# Patient Record
Sex: FEMALE | Race: WHITE | NOT HISPANIC OR LATINO | Employment: UNEMPLOYED | URBAN - METROPOLITAN AREA
[De-identification: names, ages, dates, MRNs, and addresses within clinical notes are randomized per-mention and may not be internally consistent; named-entity substitution may affect disease eponyms.]

---

## 2017-03-15 ENCOUNTER — HOSPITAL ENCOUNTER (OUTPATIENT)
Dept: RADIOLOGY | Facility: MEDICAL CENTER | Age: 52
Discharge: HOME | End: 2017-03-15
Payer: COMMERCIAL

## 2017-03-15 DIAGNOSIS — Z12.31 OTHER SCREENING MAMMOGRAM: ICD-10-CM

## 2017-03-15 PROCEDURE — G0202 SCR MAMMO BI INCL CAD: HCPCS

## 2017-07-03 ENCOUNTER — TRANSCRIBE ORDERS (OUTPATIENT)
Dept: URGENT CARE | Facility: MEDICAL CENTER | Age: 52
End: 2017-07-03

## 2017-07-03 ENCOUNTER — APPOINTMENT (OUTPATIENT)
Dept: RADIOLOGY | Facility: MEDICAL CENTER | Age: 52
End: 2017-07-03
Payer: COMMERCIAL

## 2017-07-03 DIAGNOSIS — S93.402A SPRAIN OF LEFT ANKLE, UNSPECIFIED LIGAMENT, INITIAL ENCOUNTER: Primary | ICD-10-CM

## 2017-07-03 PROCEDURE — 73600 X-RAY EXAM OF ANKLE: CPT

## 2018-03-15 ENCOUNTER — TRANSCRIBE ORDERS (OUTPATIENT)
Dept: ADMINISTRATIVE | Facility: HOSPITAL | Age: 53
End: 2018-03-15

## 2018-03-15 DIAGNOSIS — Z12.31 VISIT FOR SCREENING MAMMOGRAM: Primary | ICD-10-CM

## 2018-04-12 ENCOUNTER — HOSPITAL ENCOUNTER (OUTPATIENT)
Dept: RADIOLOGY | Facility: MEDICAL CENTER | Age: 53
Discharge: HOME | End: 2018-04-12
Payer: COMMERCIAL

## 2018-04-12 DIAGNOSIS — Z12.31 VISIT FOR SCREENING MAMMOGRAM: ICD-10-CM

## 2018-04-12 PROCEDURE — 77067 SCR MAMMO BI INCL CAD: CPT

## 2019-04-16 ENCOUNTER — TRANSCRIBE ORDERS (OUTPATIENT)
Dept: ADMINISTRATIVE | Facility: HOSPITAL | Age: 54
End: 2019-04-16

## 2019-04-16 DIAGNOSIS — Z12.39 BREAST SCREENING, UNSPECIFIED: Primary | ICD-10-CM

## 2019-04-18 ENCOUNTER — HOSPITAL ENCOUNTER (OUTPATIENT)
Dept: RADIOLOGY | Facility: MEDICAL CENTER | Age: 54
Discharge: HOME | End: 2019-04-18
Payer: COMMERCIAL

## 2019-04-18 VITALS — HEIGHT: 68 IN | BODY MASS INDEX: 37.13 KG/M2 | WEIGHT: 245 LBS

## 2019-04-18 DIAGNOSIS — Z12.39 BREAST SCREENING, UNSPECIFIED: ICD-10-CM

## 2019-04-18 PROCEDURE — 77067 SCR MAMMO BI INCL CAD: CPT

## 2019-07-09 ENCOUNTER — TRANSCRIBE ORDERS (OUTPATIENT)
Dept: URGENT CARE | Facility: MEDICAL CENTER | Age: 54
End: 2019-07-09

## 2019-07-09 ENCOUNTER — APPOINTMENT (OUTPATIENT)
Dept: RADIOLOGY | Facility: MEDICAL CENTER | Age: 54
End: 2019-07-09
Payer: COMMERCIAL

## 2019-07-09 DIAGNOSIS — M79.672 LEFT FOOT PAIN: ICD-10-CM

## 2019-07-09 DIAGNOSIS — M79.672 LEFT FOOT PAIN: Primary | ICD-10-CM

## 2019-07-09 PROCEDURE — 73630 X-RAY EXAM OF FOOT: CPT

## 2020-07-13 ENCOUNTER — TRANSCRIBE ORDERS (OUTPATIENT)
Dept: ADMINISTRATIVE | Facility: HOSPITAL | Age: 55
End: 2020-07-13

## 2020-07-13 DIAGNOSIS — Z12.31 ENCOUNTER FOR SCREENING MAMMOGRAM FOR MALIGNANT NEOPLASM OF BREAST: Primary | ICD-10-CM

## 2020-09-24 ENCOUNTER — HOSPITAL ENCOUNTER (OUTPATIENT)
Dept: RADIOLOGY | Facility: MEDICAL CENTER | Age: 55
Discharge: HOME | End: 2020-09-24
Payer: COMMERCIAL

## 2020-09-24 VITALS — HEIGHT: 68 IN | BODY MASS INDEX: 38.34 KG/M2 | WEIGHT: 253 LBS

## 2020-09-24 DIAGNOSIS — Z12.31 VISIT FOR SCREENING MAMMOGRAM: ICD-10-CM

## 2020-09-24 DIAGNOSIS — Z12.31 ENCOUNTER FOR SCREENING MAMMOGRAM FOR MALIGNANT NEOPLASM OF BREAST: ICD-10-CM

## 2020-09-24 PROCEDURE — 77067 SCR MAMMO BI INCL CAD: CPT

## 2020-09-24 PROCEDURE — 77063 BREAST TOMOSYNTHESIS BI: CPT

## 2020-11-04 ENCOUNTER — NURSE TRIAGE (OUTPATIENT)
Dept: OTHER | Facility: OTHER | Age: 55
End: 2020-11-04

## 2020-11-04 DIAGNOSIS — Z20.822 COVID-19 RULED OUT: ICD-10-CM

## 2020-11-04 DIAGNOSIS — Z20.822 COVID-19 RULED OUT: Primary | ICD-10-CM

## 2020-11-04 PROCEDURE — U0003 INFECTIOUS AGENT DETECTION BY NUCLEIC ACID (DNA OR RNA); SEVERE ACUTE RESPIRATORY SYNDROME CORONAVIRUS 2 (SARS-COV-2) (CORONAVIRUS DISEASE [COVID-19]), AMPLIFIED PROBE TECHNIQUE, MAKING USE OF HIGH THROUGHPUT TECHNOLOGIES AS DESCRIBED BY CMS-2020-01-R: HCPCS | Performed by: FAMILY MEDICINE

## 2020-11-06 LAB — SARS-COV-2 RNA SPEC QL NAA+PROBE: NOT DETECTED

## 2020-11-16 ENCOUNTER — TELEPHONE (OUTPATIENT)
Dept: OBGYN CLINIC | Facility: CLINIC | Age: 55
End: 2020-11-16

## 2021-04-08 DIAGNOSIS — Z23 ENCOUNTER FOR IMMUNIZATION: ICD-10-CM

## 2021-07-19 ENCOUNTER — ANNUAL EXAM (OUTPATIENT)
Dept: OBGYN CLINIC | Facility: CLINIC | Age: 56
End: 2021-07-19
Payer: COMMERCIAL

## 2021-07-19 VITALS
WEIGHT: 250 LBS | DIASTOLIC BLOOD PRESSURE: 82 MMHG | HEIGHT: 68 IN | BODY MASS INDEX: 37.89 KG/M2 | SYSTOLIC BLOOD PRESSURE: 128 MMHG

## 2021-07-19 DIAGNOSIS — Z12.31 SCREENING MAMMOGRAM, ENCOUNTER FOR: ICD-10-CM

## 2021-07-19 DIAGNOSIS — Z01.419 ENCOUNTER FOR GYNECOLOGICAL EXAMINATION WITHOUT ABNORMAL FINDING: Primary | ICD-10-CM

## 2021-07-19 DIAGNOSIS — Z12.4 SCREENING FOR MALIGNANT NEOPLASM OF CERVIX: ICD-10-CM

## 2021-07-19 PROCEDURE — 99386 PREV VISIT NEW AGE 40-64: CPT | Performed by: OBSTETRICS & GYNECOLOGY

## 2021-07-19 PROCEDURE — G0145 SCR C/V CYTO,THINLAYER,RESCR: HCPCS | Performed by: OBSTETRICS & GYNECOLOGY

## 2021-07-19 RX ORDER — VALSARTAN 80 MG/1
80 TABLET ORAL EVERY EVENING
COMMUNITY
Start: 2021-07-07

## 2021-07-19 RX ORDER — METOPROLOL TARTRATE 100 MG/1
100 TABLET ORAL 2 TIMES DAILY
COMMUNITY
Start: 2021-07-02

## 2021-07-19 RX ORDER — CETIRIZINE HYDROCHLORIDE 10 MG/1
TABLET ORAL
COMMUNITY

## 2021-07-19 RX ORDER — SIMVASTATIN 40 MG
40 TABLET ORAL DAILY
COMMUNITY
Start: 2021-07-06

## 2021-07-19 RX ORDER — FERROUS SULFATE 325(65) MG
TABLET ORAL
COMMUNITY

## 2021-07-19 RX ORDER — LEVOTHYROXINE SODIUM 0.12 MG/1
TABLET ORAL
COMMUNITY
Start: 2021-02-10

## 2021-07-19 NOTE — PROGRESS NOTES
Assessment/Plan:         Diagnoses and all orders for this visit:    Encounter for gynecological examination without abnormal finding    Screening mammogram, encounter for  -     Mammo screening bilateral w 3d & cad; Future    Other orders  -     metoprolol tartrate (LOPRESSOR) 100 mg tablet; Take 100 mg by mouth 2 (two) times a day  -     levothyroxine 125 mcg tablet  -     valsartan (DIOVAN) 80 mg tablet; Take 80 mg by mouth every evening  -     simvastatin (ZOCOR) 40 mg tablet; Take 40 mg by mouth daily  -     ferrous sulfate 325 (65 Fe) mg tablet  -     cetirizine (ZyrTEC) 10 mg tablet          Subjective:      Patient ID: Анна Harris is a 63 y o  female  The patient is a 48-year-old  2 para  who presents with amenorrhea since 2020  She denies hot flashes, night sweats or any other symptoms attributable to menopause  She is generally in good health  And has had no medical changes over the past year  She is vaccinated for the COVID infection  She states 7 takes care of her granddaughter  She is following proper precautions during the pandemic  She will schedule a mammogram in the near future  And we will see her back in 1 year or as needed  The following portions of the patient's history were reviewed and updated as appropriate: allergies, current medications, past family history, past medical history, past social history, past surgical history and problem list     Review of Systems   Constitutional: Negative for chills, diaphoresis, fatigue, fever and unexpected weight change  HENT: Negative for congestion, sinus pressure, sinus pain, tinnitus and trouble swallowing  Eyes: Negative for visual disturbance  Respiratory: Negative for cough, chest tightness and shortness of breath  Cardiovascular: Negative for chest pain, palpitations and leg swelling     Gastrointestinal: Negative for abdominal distention, abdominal pain, anal bleeding, constipation, diarrhea, nausea, rectal pain and vomiting  Endocrine: Negative for heat intolerance  Genitourinary: Negative for difficulty urinating, dysuria, flank pain, frequency, genital sores, hematuria and urgency  Musculoskeletal: Negative for arthralgias, back pain and joint swelling  Skin: Negative for rash  Allergic/Immunologic: Negative for environmental allergies and food allergies  Neurological: Negative for headaches  Hematological: Negative for adenopathy  Does not bruise/bleed easily  Psychiatric/Behavioral: Negative for decreased concentration and dysphoric mood  The patient is not nervous/anxious  Objective:      /82 (BP Location: Left arm, Patient Position: Sitting, Cuff Size: Standard)   Ht 5' 8" (1 727 m)   Wt 113 kg (250 lb)   LMP 11/01/2020 (Approximate)   BMI 38 01 kg/m²          Physical Exam  Vitals and nursing note reviewed  Exam conducted with a chaperone present  Constitutional:       General: She is not in acute distress  Appearance: Normal appearance  She is normal weight  She is not ill-appearing  HENT:      Head: Normocephalic  Nose: Nose normal       Mouth/Throat:      Mouth: Mucous membranes are moist       Pharynx: Oropharynx is clear  Eyes:      Conjunctiva/sclera: Conjunctivae normal       Pupils: Pupils are equal, round, and reactive to light  Cardiovascular:      Rate and Rhythm: Normal rate and regular rhythm  Pulses: Normal pulses  Pulmonary:      Effort: Pulmonary effort is normal       Breath sounds: Normal breath sounds  Chest:      Breasts: Sudeep Score is 5  Right: Normal  No mass, nipple discharge, skin change or tenderness  Left: Normal  No mass, nipple discharge, skin change or tenderness  Abdominal:      General: Abdomen is flat  Bowel sounds are normal       Palpations: Abdomen is soft  Genitourinary:     General: Normal vulva  Exam position: Lithotomy position  Sudeep stage (genital): 5  Vagina: Normal       Cervix: Normal       Uterus: Normal        Adnexa: Right adnexa normal and left adnexa normal       Rectum: Normal    Musculoskeletal:         General: Normal range of motion  Cervical back: Neck supple  Lymphadenopathy:      Upper Body:      Right upper body: No axillary adenopathy  Left upper body: No axillary adenopathy  Skin:     General: Skin is warm and dry  Neurological:      General: No focal deficit present  Mental Status: She is alert     Psychiatric:         Mood and Affect: Mood normal

## 2021-07-27 LAB
LAB AP GYN PRIMARY INTERPRETATION: NORMAL
Lab: NORMAL

## 2022-06-20 ENCOUNTER — HOSPITAL ENCOUNTER (EMERGENCY)
Facility: HOSPITAL | Age: 57
Discharge: HOME | End: 2022-06-21
Attending: EMERGENCY MEDICINE
Payer: COMMERCIAL

## 2022-06-20 VITALS
BODY MASS INDEX: 38.01 KG/M2 | SYSTOLIC BLOOD PRESSURE: 139 MMHG | WEIGHT: 250 LBS | OXYGEN SATURATION: 95 % | HEART RATE: 88 BPM | TEMPERATURE: 98.3 F | DIASTOLIC BLOOD PRESSURE: 85 MMHG | RESPIRATION RATE: 17 BRPM

## 2022-06-20 DIAGNOSIS — R42 VERTIGO: ICD-10-CM

## 2022-06-20 DIAGNOSIS — R42 DIZZINESS: Primary | ICD-10-CM

## 2022-06-20 LAB
ALBUMIN SERPL BCP-MCNC: 4.4 G/DL (ref 3.5–5)
ALP SERPL-CCNC: 74 U/L (ref 34–104)
ALT SERPL W P-5'-P-CCNC: 19 U/L (ref 7–52)
ANION GAP SERPL CALCULATED.3IONS-SCNC: 8 MMOL/L (ref 4–13)
AST SERPL W P-5'-P-CCNC: 18 U/L (ref 13–39)
BASOPHILS # BLD AUTO: 0.04 THOUSANDS/ΜL (ref 0–0.1)
BASOPHILS NFR BLD AUTO: 1 % (ref 0–1)
BILIRUB SERPL-MCNC: 0.41 MG/DL (ref 0.2–1)
BUN SERPL-MCNC: 16 MG/DL (ref 5–25)
CALCIUM SERPL-MCNC: 9.4 MG/DL (ref 8.4–10.2)
CARDIAC TROPONIN I PNL SERPL HS: 2 NG/L
CHLORIDE SERPL-SCNC: 105 MMOL/L (ref 96–108)
CO2 SERPL-SCNC: 26 MMOL/L (ref 21–32)
CREAT SERPL-MCNC: 0.91 MG/DL (ref 0.6–1.3)
EOSINOPHIL # BLD AUTO: 0.14 THOUSAND/ΜL (ref 0–0.61)
EOSINOPHIL NFR BLD AUTO: 2 % (ref 0–6)
ERYTHROCYTE [DISTWIDTH] IN BLOOD BY AUTOMATED COUNT: 13.3 % (ref 11.6–15.1)
GFR SERPL CREATININE-BSD FRML MDRD: 70 ML/MIN/1.73SQ M
GLUCOSE SERPL-MCNC: 106 MG/DL (ref 65–140)
HCT VFR BLD AUTO: 41.5 % (ref 34.8–46.1)
HGB BLD-MCNC: 13.4 G/DL (ref 11.5–15.4)
IMM GRANULOCYTES # BLD AUTO: 0.04 THOUSAND/UL (ref 0–0.2)
IMM GRANULOCYTES NFR BLD AUTO: 1 % (ref 0–2)
LYMPHOCYTES # BLD AUTO: 2.28 THOUSANDS/ΜL (ref 0.6–4.47)
LYMPHOCYTES NFR BLD AUTO: 31 % (ref 14–44)
MCH RBC QN AUTO: 28.6 PG (ref 26.8–34.3)
MCHC RBC AUTO-ENTMCNC: 32.3 G/DL (ref 31.4–37.4)
MCV RBC AUTO: 89 FL (ref 82–98)
MONOCYTES # BLD AUTO: 0.55 THOUSAND/ΜL (ref 0.17–1.22)
MONOCYTES NFR BLD AUTO: 7 % (ref 4–12)
NEUTROPHILS # BLD AUTO: 4.39 THOUSANDS/ΜL (ref 1.85–7.62)
NEUTS SEG NFR BLD AUTO: 58 % (ref 43–75)
NRBC BLD AUTO-RTO: 0 /100 WBCS
PLATELET # BLD AUTO: 292 THOUSANDS/UL (ref 149–390)
PMV BLD AUTO: 9.2 FL (ref 8.9–12.7)
POTASSIUM SERPL-SCNC: 3.9 MMOL/L (ref 3.5–5.3)
PROT SERPL-MCNC: 7.2 G/DL (ref 6.4–8.4)
RBC # BLD AUTO: 4.68 MILLION/UL (ref 3.81–5.12)
SODIUM SERPL-SCNC: 139 MMOL/L (ref 135–147)
WBC # BLD AUTO: 7.44 THOUSAND/UL (ref 4.31–10.16)

## 2022-06-20 PROCEDURE — 96361 HYDRATE IV INFUSION ADD-ON: CPT

## 2022-06-20 PROCEDURE — 80053 COMPREHEN METABOLIC PANEL: CPT | Performed by: EMERGENCY MEDICINE

## 2022-06-20 PROCEDURE — 99285 EMERGENCY DEPT VISIT HI MDM: CPT | Performed by: EMERGENCY MEDICINE

## 2022-06-20 PROCEDURE — 93005 ELECTROCARDIOGRAM TRACING: CPT

## 2022-06-20 PROCEDURE — 99284 EMERGENCY DEPT VISIT MOD MDM: CPT

## 2022-06-20 PROCEDURE — 36415 COLL VENOUS BLD VENIPUNCTURE: CPT

## 2022-06-20 PROCEDURE — 84484 ASSAY OF TROPONIN QUANT: CPT | Performed by: EMERGENCY MEDICINE

## 2022-06-20 PROCEDURE — 85025 COMPLETE CBC W/AUTO DIFF WBC: CPT | Performed by: EMERGENCY MEDICINE

## 2022-06-20 PROCEDURE — 96374 THER/PROPH/DIAG INJ IV PUSH: CPT

## 2022-06-20 RX ORDER — MECLIZINE HCL 12.5 MG/1
25 TABLET ORAL ONCE
Status: COMPLETED | OUTPATIENT
Start: 2022-06-20 | End: 2022-06-20

## 2022-06-20 RX ORDER — MECLIZINE HYDROCHLORIDE 25 MG/1
25 TABLET ORAL 3 TIMES DAILY PRN
Qty: 15 TABLET | Refills: 0 | Status: SHIPPED | OUTPATIENT
Start: 2022-06-20 | End: 2022-06-24

## 2022-06-20 RX ORDER — DIAZEPAM 5 MG/ML
5 INJECTION, SOLUTION INTRAMUSCULAR; INTRAVENOUS ONCE
Status: COMPLETED | OUTPATIENT
Start: 2022-06-20 | End: 2022-06-20

## 2022-06-20 RX ORDER — DIAZEPAM 5 MG/1
5 TABLET ORAL 2 TIMES DAILY
Qty: 20 TABLET | Refills: 0 | Status: SHIPPED | OUTPATIENT
Start: 2022-06-20 | End: 2022-06-30

## 2022-06-20 RX ADMIN — DIAZEPAM 5 MG: 10 INJECTION, SOLUTION INTRAMUSCULAR; INTRAVENOUS at 22:51

## 2022-06-20 RX ADMIN — MECLIZINE 25 MG: 12.5 TABLET ORAL at 22:51

## 2022-06-20 RX ADMIN — SODIUM CHLORIDE 1000 ML: 0.9 INJECTION, SOLUTION INTRAVENOUS at 22:51

## 2022-06-20 NOTE — Clinical Note
Samm Potts was seen and treated in our emergency department on 6/20/2022  Diagnosis:     Cindy Flores  may return to work on return date  She may return on this date: 06/23/2022         If you have any questions or concerns, please don't hesitate to call        Addie Morgan MD    ______________________________           _______________          _______________  [facility] Representative                              Date                                Time

## 2022-06-21 LAB
ATRIAL RATE: 94 BPM
P AXIS: 39 DEGREES
PR INTERVAL: 206 MS
QRS AXIS: 91 DEGREES
QRSD INTERVAL: 88 MS
QT INTERVAL: 328 MS
QTC INTERVAL: 402 MS
T WAVE AXIS: 21 DEGREES
VENTRICULAR RATE: 90 BPM

## 2022-06-21 PROCEDURE — 93010 ELECTROCARDIOGRAM REPORT: CPT | Performed by: INTERNAL MEDICINE

## 2022-06-21 NOTE — ED PROVIDER NOTES
History  Chief Complaint   Patient presents with    Dizziness     Pt with hx of vertigo c/o dizziness  Pt also c/o shaking on and off for several hours  History provided by:  Patient   used: No    Dizziness  Quality:  Head spinning and imbalance  Severity:  Moderate  Onset quality:  Gradual  Duration:  1 week  Timing:  Intermittent  Progression:  Waxing and waning  Chronicity:  New  Context: head movement and physical activity    Relieved by:  Being still  Worsened by:  Nothing  Ineffective treatments:  None tried  Associated symptoms: no chest pain, no diarrhea, no hearing loss, no nausea, no palpitations and no weakness    Risk factors: no new medications        Prior to Admission Medications   Prescriptions Last Dose Informant Patient Reported? Taking? cetirizine (ZyrTEC) 10 mg tablet 6/20/2022 at Unknown time  Yes Yes   ferrous sulfate 325 (65 Fe) mg tablet 6/20/2022 at Unknown time  Yes Yes   levothyroxine 125 mcg tablet 6/20/2022 at Unknown time  Yes Yes   metoprolol tartrate (LOPRESSOR) 100 mg tablet 6/20/2022 at Unknown time  Yes Yes   Sig: Take 100 mg by mouth 2 (two) times a day   simvastatin (ZOCOR) 40 mg tablet 6/19/2022 at Unknown time  Yes Yes   Sig: Take 40 mg by mouth daily   valsartan (DIOVAN) 80 mg tablet 6/19/2022 at Unknown time  Yes Yes   Sig: Take 80 mg by mouth every evening      Facility-Administered Medications: None       History reviewed  No pertinent past medical history  Past Surgical History:   Procedure Laterality Date    SHOULDER SURGERY      THYROIDECTOMY         Family History   Problem Relation Age of Onset    Colon cancer Father 80    Breast cancer Maternal Aunt 39    Brain cancer Mother 80    No Known Problems Sister     No Known Problems Son     No Known Problems Son      I have reviewed and agree with the history as documented      E-Cigarette/Vaping    E-Cigarette Use Never User      E-Cigarette/Vaping Substances     Social History Tobacco Use    Smoking status: Never Smoker    Smokeless tobacco: Never Used   Vaping Use    Vaping Use: Never used   Substance Use Topics    Alcohol use: Not Currently    Drug use: Not Currently       Review of Systems   HENT: Negative for hearing loss  Cardiovascular: Negative for chest pain and palpitations  Gastrointestinal: Negative for diarrhea and nausea  Neurological: Positive for dizziness  Negative for weakness  All other systems reviewed and are negative  Physical Exam  Physical Exam  Vitals and nursing note reviewed  Constitutional:       General: She is not in acute distress  Appearance: She is well-developed  HENT:      Head: Normocephalic and atraumatic  Eyes:      Pupils: Pupils are equal, round, and reactive to light  Cardiovascular:      Rate and Rhythm: Normal rate and regular rhythm  Heart sounds: Normal heart sounds  No murmur heard  Pulmonary:      Effort: Pulmonary effort is normal  No respiratory distress  Breath sounds: Normal breath sounds  No wheezing or rales  Abdominal:      General: Bowel sounds are normal  There is no distension  Palpations: Abdomen is soft  Tenderness: There is no abdominal tenderness  There is no guarding or rebound  Musculoskeletal:         General: No deformity  Normal range of motion  Cervical back: Normal range of motion and neck supple  Lymphadenopathy:      Cervical: No cervical adenopathy  Skin:     Capillary Refill: Capillary refill takes less than 2 seconds  Findings: No erythema or rash  Neurological:      Mental Status: She is alert and oriented to person, place, and time  Cranial Nerves: No cranial nerve deficit  Motor: No abnormal muscle tone        Coordination: Coordination normal    Psychiatric:         Behavior: Behavior normal          Vital Signs  ED Triage Vitals   Temperature Pulse Respirations Blood Pressure SpO2   06/20/22 2030 06/20/22 2027 06/20/22 2027 06/20/22 2027 06/20/22 2027   98 3 °F (36 8 °C) 93 18 147/83 96 %      Temp src Heart Rate Source Patient Position - Orthostatic VS BP Location FiO2 (%)   -- 06/20/22 2258 -- -- --    Monitor         Pain Score       --                  Vitals:    06/20/22 2027 06/20/22 2258   BP: 147/83 139/85   Pulse: 93 88         Visual Acuity      ED Medications  Medications   sodium chloride 0 9 % bolus 1,000 mL (0 mL Intravenous Stopped 6/21/22 0051)   meclizine (ANTIVERT) tablet 25 mg (25 mg Oral Given 6/20/22 2251)   diazepam (VALIUM) injection 5 mg (5 mg Intravenous Given 6/20/22 2251)       Diagnostic Studies  Results Reviewed     Procedure Component Value Units Date/Time    HS Troponin 0hr (reflex protocol) [[id number]]  (Normal) Collected: 06/20/22 2037    Lab Status: Final result Specimen: Blood from Arm, Right Updated: 06/20/22 2121     hs TnI 0hr 2 ng/L     Comprehensive metabolic panel [[id number]] Collected: 06/20/22 2037    Lab Status: Final result Specimen: Blood from Arm, Right Updated: 06/20/22 2112     Sodium 139 mmol/L      Potassium 3 9 mmol/L      Chloride 105 mmol/L      CO2 26 mmol/L      ANION GAP 8 mmol/L      BUN 16 mg/dL      Creatinine 0 91 mg/dL      Glucose 106 mg/dL      Calcium 9 4 mg/dL      AST 18 U/L      ALT 19 U/L      Alkaline Phosphatase 74 U/L      Total Protein 7 2 g/dL      Albumin 4 4 g/dL      Total Bilirubin 0 41 mg/dL      eGFR 70 ml/min/1 73sq m     Narrative:      Blayne guidelines for Chronic Kidney Disease (CKD):     Stage 1 with normal or high GFR (GFR > 90 mL/min/1 73 square meters)    Stage 2 Mild CKD (GFR = 60-89 mL/min/1 73 square meters)    Stage 3A Moderate CKD (GFR = 45-59 mL/min/1 73 square meters)    Stage 3B Moderate CKD (GFR = 30-44 mL/min/1 73 square meters)    Stage 4 Severe CKD (GFR = 15-29 mL/min/1 73 square meters)    Stage 5 End Stage CKD (GFR <15 mL/min/1 73 square meters)  Note: GFR calculation is accurate only with a steady state creatinine    CBC and differential [[id number]] Collected: 06/20/22 2037    Lab Status: Final result Specimen: Blood from Arm, Right Updated: 06/20/22 2052     WBC 7 44 Thousand/uL      RBC 4 68 Million/uL      Hemoglobin 13 4 g/dL      Hematocrit 41 5 %      MCV 89 fL      MCH 28 6 pg      MCHC 32 3 g/dL      RDW 13 3 %      MPV 9 2 fL      Platelets 245 Thousands/uL      nRBC 0 /100 WBCs      Neutrophils Relative 58 %      Immat GRANS % 1 %      Lymphocytes Relative 31 %      Monocytes Relative 7 %      Eosinophils Relative 2 %      Basophils Relative 1 %      Neutrophils Absolute 4 39 Thousands/µL      Immature Grans Absolute 0 04 Thousand/uL      Lymphocytes Absolute 2 28 Thousands/µL      Monocytes Absolute 0 55 Thousand/µL      Eosinophils Absolute 0 14 Thousand/µL      Basophils Absolute 0 04 Thousands/µL                  No orders to display              Procedures  ECG 12 Lead Documentation Only    Date/Time: 6/20/2022 10:42 PM  Performed by: June Smith MD  Authorized by: June Smith MD     Indications / Diagnosis:  Dizziness  ECG reviewed by me, the ED Provider: yes    Patient location:  ED  Interpretation:     Interpretation: normal    Rate:     ECG rate:  90    ECG rate assessment: normal    Rhythm:     Rhythm: sinus rhythm    Ectopy:     Ectopy: none    QRS:     QRS axis:  Normal    QRS intervals:  Normal  Conduction:     Conduction: normal    ST segments:     ST segments:  Normal  T waves:     T waves: non-specific               ED Course             HEART Risk Score    Flowsheet Row Most Recent Value   Heart Score Risk Calculator    History 0 Filed at: 06/20/2022 2243   ECG 1 Filed at: 06/20/2022 2243   Age 1 Filed at: 06/20/2022 2243   Risk Factors 1 Filed at: 06/20/2022 2243   Troponin 0 Filed at: 06/20/2022 2243   HEART Score 3 Filed at: 06/20/2022 2243                                      MDM  Number of Diagnoses or Management Options  Dizziness: new and requires workup  Vertigo: new and requires workup  Diagnosis management comments: Dizziness consistent with peripheral vertigo  No significant ataxia  No headache  No chest pain  History of the same  Has some sinus pain roughly 1 week ago  No tenderness over the sinuses at this point, do not suspect sinus infection  EKG, labs unremarkable  Patient did clinically improve after meclizine, Valium  Short prescription for both as an outpatient  Return precautions discussed, PCP follow-up recommended  Amount and/or Complexity of Data Reviewed  Clinical lab tests: ordered and reviewed  Tests in the medicine section of CPT®: ordered and reviewed    Risk of Complications, Morbidity, and/or Mortality  Presenting problems: high  Diagnostic procedures: moderate  Management options: high    Patient Progress  Patient progress: improved      Disposition  Final diagnoses:   Dizziness   Vertigo     Time reflects when diagnosis was documented in both MDM as applicable and the Disposition within this note     Time User Action Codes Description Comment    6/20/2022 11:37 PM Lola Feller Add [R42] Dizziness     6/20/2022 11:37 PM Lola Feller Add [R42] Vertigo       ED Disposition     ED Disposition   Discharge    Condition   Stable    Date/Time   Mon Jun 20, 2022 11:37 PM    [address] [facility] discharge to home/self care  Follow-up Information     Follow up With Specialties Details Why Contact Info Additional Ade Stewart DO Family Medicine Schedule an appointment as soon as possible for a visit in 3 days As needed [address]   [facility] [address] [address] Emergency Department Emergency Medicine Go to  If symptoms worsen [address] [facility] [address] [facility] Emergency Department, [address], [place], [place], [address]          Discharge Medication List as of 6/20/2022 11:39 PM      START taking these medications    Details   diazepam (VALIUM) 5 mg tablet Take 1 tablet (5 mg total) by mouth 2 (two) times a day for 10 days, Starting Mon 6/20/2022, Until Thu 6/30/2022, Normal      meclizine (ANTIVERT) 25 mg tablet Take 1 tablet (25 mg total) by mouth 3 (three) times a day as needed for dizziness for up to 5 days, Starting Mon 6/20/2022, Until Sat 6/25/2022 at 2359, Normal         CONTINUE these medications which have NOT CHANGED    Details   cetirizine (ZyrTEC) 10 mg tablet Historical Med      ferrous sulfate 325 (65 Fe) mg tablet Historical Med      levothyroxine 125 mcg tablet Starting Wed 2/10/2021, Historical Med      metoprolol tartrate (LOPRESSOR) 100 mg tablet Take 100 mg by mouth 2 (two) times a day, Starting Fri 7/2/2021, Historical Med      simvastatin (ZOCOR) 40 mg tablet Take 40 mg by mouth daily, Starting Tue 7/6/2021, Historical Med      valsartan (DIOVAN) 80 mg tablet Take 80 mg by mouth every evening, Starting Wed 7/7/2021, Historical Med             No discharge procedures on file      PDMP Review     None          ED Provider  Electronically Signed by           Jessica Cabezas MD  06/21/22 4231

## 2022-08-19 ENCOUNTER — TELEPHONE (OUTPATIENT)
Dept: GASTROENTEROLOGY | Facility: AMBULARY SURGERY CENTER | Age: 57
End: 2022-08-19

## 2022-08-19 ENCOUNTER — TELEPHONE (OUTPATIENT)
Dept: GASTROENTEROLOGY | Facility: CLINIC | Age: 57
End: 2022-08-19

## 2022-08-19 NOTE — TELEPHONE ENCOUNTER
Called and lmom for pt to call back to schedule her colon with Dr Rey Dickson  He recall is for 9/23/22  Needs to be scheduled after that date  I will try calling again in 1 week  If she calls back, please get her scheduled  Thank you

## 2022-08-19 NOTE — TELEPHONE ENCOUNTER
[facility] 26 Assessment    Name: Genny Gamboa  YOB: 1965  Last Height: 5' 8" (1 727 m)  Last weight: 113 kg (250 lb)  BMI: 38 01 kg/m²  Procedure: colon  Diagnosis: see order  Date of procedure: 10/10/22  Prep: miralax and dul  Responsible : yes  Phone#: [phone]  Name completing form: Darlene Giordano  Date form completed: 08/19/22      If the patient answers yes to any of these questions, schedule in a hospital  Are you pregnant: No  Do you rely on a wheelchair for mobility: No  Have you been diagnosed with End Stage Renal Disease (ESRD): No  Do you need oxygen during the day: No  Have you had a heart attack or stroke within the past three months: No  Have you had a seizure within the past three months: No  Have you ever been informed by anesthesia that you have a difficult airway: No  Additional Questions  Have you had any cardiac testing or are under the care of a Cardiologist (see cardiac list): No  Cardiac list:   Do you have an implanted cardiac defibrillator: No (Comment:  This patient should be scheduled in the hospital)    Have any bleeding problems, such as anemia or hemophilia (If patient has H&H result below 8, schedule in hospital   H&H must be within 30 days of procedure): No    Had an organ transplant within the past 3 months: No    Do you have any present infections: No  Do you get short of breath when walking a few blocks: No  Have you been diagnosed with diabetes: No  Comments (provide cardiac provider information if applicable):

## 2022-10-03 ENCOUNTER — TELEPHONE (OUTPATIENT)
Dept: GASTROENTEROLOGY | Facility: CLINIC | Age: 57
End: 2022-10-03

## 2022-10-03 ENCOUNTER — HOSPITAL ENCOUNTER (OUTPATIENT)
Dept: RADIOLOGY | Facility: MEDICAL CENTER | Age: 57
Discharge: HOME | End: 2022-10-03
Payer: COMMERCIAL

## 2022-10-03 VITALS — WEIGHT: 250 LBS | BODY MASS INDEX: 37.89 KG/M2 | HEIGHT: 68 IN

## 2022-10-03 DIAGNOSIS — Z12.31 ENCOUNTER FOR SCREENING MAMMOGRAM FOR MALIGNANT NEOPLASM OF BREAST: ICD-10-CM

## 2022-10-03 PROCEDURE — 77063 BREAST TOMOSYNTHESIS BI: CPT

## 2022-10-03 PROCEDURE — 77067 SCR MAMMO BI INCL CAD: CPT

## 2022-10-03 NOTE — TELEPHONE ENCOUNTER
lmom confirming pt's colonoscopy scheduled on 10/10/22 at [facility] with Dr Spring Media  Informed would be called 1-2 days prior with the arrival time  Informed of clear liquid diet the day prior as well as the bowel cleansing preparation  Informed would need a  the day of the procedure due to being under sedation  I asked pt to please call back if does not have instructions or if has any questions  Advised to contact insurance if has any questions regarding coverage of procedure

## 2022-10-10 ENCOUNTER — ANESTHESIA (OUTPATIENT)
Dept: GASTROENTEROLOGY | Facility: AMBULATORY SURGERY CENTER | Age: 57
End: 2022-10-10

## 2022-10-10 ENCOUNTER — HOSPITAL ENCOUNTER (OUTPATIENT)
Dept: GASTROENTEROLOGY | Facility: AMBULATORY SURGERY CENTER | Age: 57
Discharge: HOME | End: 2022-10-10
Payer: COMMERCIAL

## 2022-10-10 ENCOUNTER — ANESTHESIA EVENT (OUTPATIENT)
Dept: GASTROENTEROLOGY | Facility: AMBULATORY SURGERY CENTER | Age: 57
End: 2022-10-10

## 2022-10-10 VITALS
HEIGHT: 68 IN | RESPIRATION RATE: 18 BRPM | HEART RATE: 54 BPM | SYSTOLIC BLOOD PRESSURE: 114 MMHG | WEIGHT: 250 LBS | TEMPERATURE: 97 F | BODY MASS INDEX: 37.89 KG/M2 | OXYGEN SATURATION: 98 % | DIASTOLIC BLOOD PRESSURE: 65 MMHG

## 2022-10-10 DIAGNOSIS — Z80.0 FAMILY HISTORY OF COLON CANCER: ICD-10-CM

## 2022-10-10 DIAGNOSIS — Z86.010 HISTORY OF COLON POLYPS: ICD-10-CM

## 2022-10-10 PROCEDURE — G0121 COLON CA SCRN NOT HI RSK IND: HCPCS | Performed by: INTERNAL MEDICINE

## 2022-10-10 RX ORDER — LIDOCAINE HYDROCHLORIDE 10 MG/ML
INJECTION, SOLUTION EPIDURAL; INFILTRATION; INTRACAUDAL; PERINEURAL AS NEEDED
Status: DISCONTINUED | OUTPATIENT
Start: 2022-10-10 | End: 2022-10-10

## 2022-10-10 RX ORDER — PROPOFOL 10 MG/ML
INJECTION, EMULSION INTRAVENOUS AS NEEDED
Status: DISCONTINUED | OUTPATIENT
Start: 2022-10-10 | End: 2022-10-10

## 2022-10-10 RX ORDER — SODIUM CHLORIDE, SODIUM LACTATE, POTASSIUM CHLORIDE, CALCIUM CHLORIDE 600; 310; 30; 20 MG/100ML; MG/100ML; MG/100ML; MG/100ML
125 INJECTION, SOLUTION INTRAVENOUS CONTINUOUS
Status: CANCELLED | OUTPATIENT
Start: 2022-10-10

## 2022-10-10 RX ORDER — SODIUM CHLORIDE, SODIUM LACTATE, POTASSIUM CHLORIDE, CALCIUM CHLORIDE 600; 310; 30; 20 MG/100ML; MG/100ML; MG/100ML; MG/100ML
125 INJECTION, SOLUTION INTRAVENOUS CONTINUOUS
Status: DISCONTINUED | OUTPATIENT
Start: 2022-10-10 | End: 2022-10-14 | Stop reason: HOSPADM

## 2022-10-10 RX ORDER — SODIUM CHLORIDE, SODIUM LACTATE, POTASSIUM CHLORIDE, CALCIUM CHLORIDE 600; 310; 30; 20 MG/100ML; MG/100ML; MG/100ML; MG/100ML
INJECTION, SOLUTION INTRAVENOUS CONTINUOUS PRN
Status: DISCONTINUED | OUTPATIENT
Start: 2022-10-10 | End: 2022-10-10

## 2022-10-10 RX ADMIN — PROPOFOL 100 MG: 10 INJECTION, EMULSION INTRAVENOUS at 11:27

## 2022-10-10 RX ADMIN — SODIUM CHLORIDE, SODIUM LACTATE, POTASSIUM CHLORIDE, CALCIUM CHLORIDE: 600; 310; 30; 20 INJECTION, SOLUTION INTRAVENOUS at 11:23

## 2022-10-10 RX ADMIN — PROPOFOL 20 MG: 10 INJECTION, EMULSION INTRAVENOUS at 11:33

## 2022-10-10 RX ADMIN — PROPOFOL 20 MG: 10 INJECTION, EMULSION INTRAVENOUS at 11:37

## 2022-10-10 RX ADMIN — PROPOFOL 40 MG: 10 INJECTION, EMULSION INTRAVENOUS at 11:29

## 2022-10-10 RX ADMIN — PROPOFOL 20 MG: 10 INJECTION, EMULSION INTRAVENOUS at 11:31

## 2022-10-10 RX ADMIN — PROPOFOL 20 MG: 10 INJECTION, EMULSION INTRAVENOUS at 11:41

## 2022-10-10 RX ADMIN — PROPOFOL 20 MG: 10 INJECTION, EMULSION INTRAVENOUS at 11:35

## 2022-10-10 RX ADMIN — PROPOFOL 20 MG: 10 INJECTION, EMULSION INTRAVENOUS at 11:39

## 2022-10-10 RX ADMIN — LIDOCAINE HYDROCHLORIDE 50 MG: 10 INJECTION, SOLUTION EPIDURAL; INFILTRATION; INTRACAUDAL; PERINEURAL at 11:27

## 2022-10-10 NOTE — H&P
History and Physical - [facility]  Jenny Ayers 61 y o  female MRN: [id number]                  HPI: Jenny Ayers is a 61y o  year old female who presents for colonoscopy  History of adenomatous colon polyp  Last colonoscopy three years ago  REVIEW OF SYSTEMS: Per the HPI, and otherwise unremarkable      Historical Information   Past Medical History:   Diagnosis Date   • Colon polyp    • Disease of thyroid gland    • Hyperlipidemia    • Hypertension    • Postoperative hypothyroidism      Past Surgical History:   Procedure Laterality Date   • COLONOSCOPY     • SHOULDER SURGERY     • THYROIDECTOMY       Social History   Social History     Substance and Sexual Activity   Alcohol Use Not Currently     Social History     Substance and Sexual Activity   Drug Use Not Currently     Social History     Tobacco Use   Smoking Status Never Smoker   Smokeless Tobacco Never Used     Family History   Problem Relation Age of Onset   • Brain cancer Mother 80   • Colon cancer Father 80   • No Known Problems Sister    • No Known Problems Maternal Grandmother    • No Known Problems Maternal Grandfather    • No Known Problems Paternal Grandmother    • No Known Problems Paternal Grandfather    • No Known Problems Son    • No Known Problems Son    • Breast cancer Maternal Aunt 39       Meds/Allergies       Current Outpatient Medications:   •  ferrous sulfate 325 (65 Fe) mg tablet  •  levothyroxine 125 mcg tablet  •  metoprolol tartrate (LOPRESSOR) 100 mg tablet  •  simvastatin (ZOCOR) 40 mg tablet  •  valsartan (DIOVAN) 80 mg tablet  •  meclizine (ANTIVERT) 25 mg tablet    Current Facility-Administered Medications:   •  lactated ringers infusion, 125 mL/hr, Intravenous, Continuous    No Known Allergies    Objective     /91   Pulse 59   Temp (!) 97 °F (36 1 °C) (Temporal)   Resp 18   Ht 5' 8" (1 727 m)   Wt 113 kg (250 lb)   LMP 11/01/2020 (Approximate)   SpO2 98%   BMI 38 01 kg/m² PHYSICAL EXAM    Gen: NAD  Head: NCAT  CV: RRR  CHEST: Clear  ABD: soft, NT/ND  EXT: no edema      ASSESSMENT/PLAN:  This is a 61y o  year old female here for colonoscopy, and she is stable and optimized for her procedure

## 2022-10-10 NOTE — ANESTHESIA POSTPROCEDURE EVALUATION
Post-Op Assessment Note    CV Status:  Stable  Pain Score: 0    Pain management: adequate     Mental Status:  Alert and awake   Hydration Status:  Euvolemic   PONV Controlled:  Controlled   Airway Patency:  Patent      Post Op Vitals Reviewed: Yes      Staff: CRNA         No complications documented      BP   94/51   Temp     Pulse 72   Resp 16   SpO2 98

## 2022-10-10 NOTE — ANESTHESIA PREPROCEDURE EVALUATION
Procedure:  COLONOSCOPY    Relevant Problems   No relevant active problems        Physical Exam    Airway    Mallampati score: II  TM Distance: >3 FB  Neck ROM: full     Dental   No notable dental hx     Cardiovascular      Pulmonary      Other Findings        Anesthesia Plan  ASA Score- 2     Anesthesia Type- IV sedation with anesthesia with ASA Monitors  Additional Monitors:   Airway Plan:           Plan Factors-Exercise tolerance (METS): >4 METS  Chart reviewed  Patient summary reviewed  Patient is not a current smoker  There is medical exclusion for perioperative obstructive sleep apnea risk education  Induction- intravenous  Postoperative Plan-     Informed Consent- Anesthetic plan and risks discussed with patient  I personally reviewed this patient with the CRNA  Discussed and agreed on the Anesthesia Plan with the CRNA  Brian Troy

## 2022-10-10 NOTE — INTERVAL H&P NOTE
H&P reviewed  After examining the patient I find no changes in the patients condition since the H&P had been written      Vitals:    10/10/22 1050   BP: 123/91   Pulse: 59   Resp: 18   Temp: (!) 97 °F (36 1 °C)   SpO2: 98%

## 2022-10-10 NOTE — DISCHARGE SUMMARY
Discharge Summary - Corina Cantu 61 y o  female MRN: [id number]    Unit/Bed#:  Encounter: [id number]    Admission Date:  10/10/2022    Admitting Diagnosis: History of colon polyps [Z86 010]  Family history of colon cancer [Z80 0]    HPI:  Patient is asymptomatic  History of colon polyp and family history of colon cancer  Procedures Performed: No orders of the defined types were placed in this encounter  Summary of Hospital Course: Tolerated procedure well    Significant Findings, Care, Treatment and Services Provided:  Colonoscopy done  Polyp not seen  Complications:  None    Discharge Diagnosis:  See above    Medical Problems             Resolved Problems  Date Reviewed: 10/10/2022   None                 Condition at Discharge: good         Discharge instructions/Information to patient and family:   See after visit summary for information provided to patient and family  Provisions for Follow-Up Care:  See after visit summary for information related to follow-up care and any pertinent home health orders        PCP: Governor Angel DO    Disposition: Home

## 2022-11-08 ENCOUNTER — ANNUAL EXAM (OUTPATIENT)
Dept: OBGYN CLINIC | Facility: CLINIC | Age: 57
End: 2022-11-08

## 2022-11-08 VITALS
DIASTOLIC BLOOD PRESSURE: 74 MMHG | HEIGHT: 68 IN | BODY MASS INDEX: 37.89 KG/M2 | SYSTOLIC BLOOD PRESSURE: 110 MMHG | WEIGHT: 250 LBS

## 2022-11-08 DIAGNOSIS — Z12.31 SCREENING MAMMOGRAM, ENCOUNTER FOR: ICD-10-CM

## 2022-11-08 DIAGNOSIS — Z01.419 ENCOUNTER FOR GYNECOLOGICAL EXAMINATION WITHOUT ABNORMAL FINDING: Primary | ICD-10-CM

## 2022-11-08 RX ORDER — LEVOTHYROXINE SODIUM 112 UG/1
112 TABLET ORAL DAILY
COMMUNITY
Start: 2022-10-29

## 2022-11-08 NOTE — PROGRESS NOTES
Assessment/Plan:         There are no diagnoses linked to this encounter  Subjective:      Patient ID: Mary Carmen Wilcox is a 61 y o  female  The patient is a 26-year-old  2 para  who presents for annual exam   She has no complaints related to OBGYN  She is postmenopausal   She has had no major medical changes over the past year and is having no ongoing problems  She will schedule a mammogram following her anniversary date  We will perform a Pap smear today  She has no history of abnormal Pap smears  We will see her back in 1 year or as needed  The following portions of the patient's history were reviewed and updated as appropriate: allergies, current medications, past family history, past medical history, past social history, past surgical history and problem list     Review of Systems   Constitutional: Negative for chills, diaphoresis, fatigue, fever and unexpected weight change  HENT: Negative for congestion, sinus pressure, sinus pain, tinnitus and trouble swallowing  Eyes: Negative for visual disturbance  Respiratory: Negative for cough, chest tightness and shortness of breath  Cardiovascular: Negative for chest pain, palpitations and leg swelling  Gastrointestinal: Negative for abdominal distention, abdominal pain, anal bleeding, constipation, diarrhea, nausea, rectal pain and vomiting  Endocrine: Negative for heat intolerance  Genitourinary: Negative for difficulty urinating, dysuria, flank pain, frequency, genital sores, hematuria and urgency  Musculoskeletal: Negative for arthralgias, back pain and joint swelling  Skin: Negative for rash  Allergic/Immunologic: Negative for environmental allergies and food allergies  Neurological: Negative for headaches  Hematological: Negative for adenopathy  Does not bruise/bleed easily  Psychiatric/Behavioral: Negative for decreased concentration and dysphoric mood  The patient is not nervous/anxious  Objective:      LMP 11/01/2020 (Approximate)          Physical Exam  Vitals and nursing note reviewed  Exam conducted with a chaperone present  Constitutional:       General: She is not in acute distress  Appearance: Normal appearance  She is normal weight  She is not ill-appearing  HENT:      Head: Normocephalic  Nose: Nose normal       Mouth/Throat:      Mouth: Mucous membranes are moist       Pharynx: Oropharynx is clear  Eyes:      Conjunctiva/sclera: Conjunctivae normal       Pupils: Pupils are equal, round, and reactive to light  Cardiovascular:      Rate and Rhythm: Normal rate and regular rhythm  Pulses: Normal pulses  Pulmonary:      Effort: Pulmonary effort is normal       Breath sounds: Normal breath sounds  Chest:   Breasts: Sudeep Score is 5  Right: Normal  No mass, nipple discharge, skin change, tenderness or axillary adenopathy  Left: Normal  No mass, nipple discharge, skin change, tenderness or axillary adenopathy  Abdominal:      General: Abdomen is flat  Bowel sounds are normal       Palpations: Abdomen is soft  Genitourinary:     General: Normal vulva  Exam position: Lithotomy position  Sudeep stage (genital): 5       Vagina: Normal       Cervix: Normal       Uterus: Normal        Adnexa: Right adnexa normal and left adnexa normal       Rectum: Normal    Musculoskeletal:         General: Normal range of motion  Cervical back: Neck supple  Lymphadenopathy:      Upper Body:      Right upper body: No axillary adenopathy  Left upper body: No axillary adenopathy  Skin:     General: Skin is warm and dry  Neurological:      General: No focal deficit present  Mental Status: She is alert     Psychiatric:         Mood and Affect: Mood normal

## 2022-11-11 LAB
HPV HR 12 DNA CVX QL NAA+PROBE: NEGATIVE
HPV16 DNA CVX QL NAA+PROBE: NEGATIVE
HPV18 DNA CVX QL NAA+PROBE: NEGATIVE

## 2022-11-13 LAB
LAB AP GYN PRIMARY INTERPRETATION: NORMAL
Lab: NORMAL

## 2023-03-01 ENCOUNTER — HOSPITAL ENCOUNTER (OUTPATIENT)
Dept: MRI IMAGING | Facility: HOSPITAL | Age: 58
Discharge: HOME | End: 2023-03-01

## 2023-03-01 DIAGNOSIS — H93.12 TINNITUS, LEFT: ICD-10-CM

## 2023-03-01 DIAGNOSIS — H90.3 ASYMMETRIC SNHL (SENSORINEURAL HEARING LOSS): ICD-10-CM

## 2023-03-01 RX ADMIN — GADOBUTROL 11 ML: 604.72 INJECTION INTRAVENOUS at 06:40

## 2024-01-03 ENCOUNTER — HOSPITAL ENCOUNTER (OUTPATIENT)
Dept: RADIOLOGY | Facility: MEDICAL CENTER | Age: 59
Discharge: HOME | End: 2024-01-03
Payer: COMMERCIAL

## 2024-01-03 VITALS — WEIGHT: 245 LBS | BODY MASS INDEX: 37.13 KG/M2 | HEIGHT: 68 IN

## 2024-01-03 DIAGNOSIS — Z12.31 ENCOUNTER FOR SCREENING MAMMOGRAM FOR BREAST CANCER: ICD-10-CM

## 2024-01-03 PROCEDURE — 77063 BREAST TOMOSYNTHESIS BI: CPT

## 2024-01-03 PROCEDURE — 77067 SCR MAMMO BI INCL CAD: CPT

## 2024-01-04 NOTE — RESULT ENCOUNTER NOTE
Marcellus Bo,     Your mammogram results came back normal. Please repeat in one year.     If you have any questions or concerns please call the office here at [phone].     Thank you,   [facility]

## 2024-01-08 ENCOUNTER — ANNUAL EXAM (OUTPATIENT)
Dept: OBGYN CLINIC | Facility: CLINIC | Age: 59
End: 2024-01-08
Payer: COMMERCIAL

## 2024-01-08 VITALS
SYSTOLIC BLOOD PRESSURE: 124 MMHG | BODY MASS INDEX: 37.44 KG/M2 | HEIGHT: 68 IN | WEIGHT: 247 LBS | DIASTOLIC BLOOD PRESSURE: 80 MMHG

## 2024-01-08 DIAGNOSIS — Z12.4 SCREENING FOR MALIGNANT NEOPLASM OF THE CERVIX: ICD-10-CM

## 2024-01-08 DIAGNOSIS — Z01.419 ENCOUNTER FOR GYNECOLOGICAL EXAMINATION WITHOUT ABNORMAL FINDING: Primary | ICD-10-CM

## 2024-01-08 PROCEDURE — G0145 SCR C/V CYTO,THINLAYER,RESCR: HCPCS | Performed by: OBSTETRICS & GYNECOLOGY

## 2024-01-08 PROCEDURE — G0476 HPV COMBO ASSAY CA SCREEN: HCPCS | Performed by: OBSTETRICS & GYNECOLOGY

## 2024-01-08 PROCEDURE — S0612 ANNUAL GYNECOLOGICAL EXAMINA: HCPCS | Performed by: OBSTETRICS & GYNECOLOGY

## 2024-01-08 NOTE — PROGRESS NOTES
Assessment/Plan:         Diagnoses and all orders for this visit:    Screening for malignant neoplasm of the cervix  -     Liquid-based pap, screening          Subjective:      Patient ID: Betzy Willard is a 58 y.o. female.    The patient is a 58-year-old  2 para  who presents for annual exam.  She has no complaints related to OBGYN.  She is postmenopausal.  She has had no major medical changes over the past year and is having no ongoing problems.  She will schedule a mammogram following her anniversary date.  We will perform a Pap smear today.  She has no history of abnormal Pap smears.  We will see her back in 1 year or as needed.        The following portions of the patient's history were reviewed and updated as appropriate: allergies, current medications, past family history, past medical history, past social history, past surgical history, and problem list.    Review of Systems   Constitutional:  Negative for chills, diaphoresis, fatigue, fever and unexpected weight change.   HENT:  Negative for congestion, sinus pressure, sinus pain, tinnitus and trouble swallowing.    Eyes:  Negative for visual disturbance.   Respiratory:  Negative for cough, chest tightness and shortness of breath.    Cardiovascular:  Negative for chest pain, palpitations and leg swelling.   Gastrointestinal:  Negative for abdominal distention, abdominal pain, anal bleeding, constipation, diarrhea, nausea, rectal pain and vomiting.   Endocrine: Negative for heat intolerance.   Genitourinary:  Negative for difficulty urinating, dysuria, flank pain, frequency, genital sores, hematuria and urgency.   Musculoskeletal:  Negative for arthralgias, back pain and joint swelling.   Skin:  Negative for rash.   Allergic/Immunologic: Negative for environmental allergies and food allergies.   Neurological:  Negative for headaches.   Hematological:  Negative for adenopathy. Does not bruise/bleed easily.   Psychiatric/Behavioral:  Negative for  "decreased concentration and dysphoric mood. The patient is not nervous/anxious.          Objective:      /80 (BP Location: Left arm)   Ht 5' 8\" (1.727 m)   Wt 112 kg (247 lb)   LMP 11/01/2020 (Approximate)   BMI 37.56 kg/m²          Physical Exam  Vitals and nursing note reviewed. Exam conducted with a chaperone present.   Constitutional:       General: She is not in acute distress.     Appearance: Normal appearance. She is normal weight. She is not ill-appearing.   HENT:      Head: Normocephalic.      Nose: Nose normal.      Mouth/Throat:      Mouth: Mucous membranes are moist.      Pharynx: Oropharynx is clear.   Eyes:      Conjunctiva/sclera: Conjunctivae normal.      Pupils: Pupils are equal, round, and reactive to light.   Cardiovascular:      Rate and Rhythm: Normal rate and regular rhythm.      Pulses: Normal pulses.   Pulmonary:      Effort: Pulmonary effort is normal.      Breath sounds: Normal breath sounds.   Chest:   Breasts:     Sudeep Score is 5.      Right: Normal. No mass, nipple discharge, skin change or tenderness.      Left: Normal. No mass, nipple discharge, skin change or tenderness.   Abdominal:      General: Abdomen is flat. Bowel sounds are normal.      Palpations: Abdomen is soft.   Genitourinary:     General: Normal vulva.      Exam position: Lithotomy position.      Sudeep stage (genital): 5.      Vagina: Normal.      Cervix: Normal.      Uterus: Normal.       Adnexa: Right adnexa normal and left adnexa normal.      Rectum: Normal.   Musculoskeletal:         General: Normal range of motion.      Cervical back: Neck supple.   Lymphadenopathy:      Upper Body:      Right upper body: No axillary adenopathy.      Left upper body: No axillary adenopathy.   Skin:     General: Skin is warm and dry.   Neurological:      General: No focal deficit present.      Mental Status: She is alert.   Psychiatric:         Mood and Affect: Mood normal.           "

## 2024-01-12 LAB
LAB AP GYN PRIMARY INTERPRETATION: NORMAL
Lab: NORMAL

## 2024-01-15 PROCEDURE — 88305 TISSUE EXAM BY PATHOLOGIST: CPT | Performed by: PATHOLOGY

## 2024-01-16 ENCOUNTER — LAB REQUISITION (OUTPATIENT)
Dept: LAB | Facility: HOSPITAL | Age: 59
End: 2024-01-16
Payer: COMMERCIAL

## 2024-01-16 DIAGNOSIS — M79.9 SOFT TISSUE DISORDER, UNSPECIFIED: ICD-10-CM

## 2024-01-18 PROCEDURE — 88305 TISSUE EXAM BY PATHOLOGIST: CPT | Performed by: PATHOLOGY

## 2024-02-10 DIAGNOSIS — Z00.6 ENCOUNTER FOR EXAMINATION FOR NORMAL COMPARISON OR CONTROL IN CLINICAL RESEARCH PROGRAM: ICD-10-CM

## 2024-02-12 ENCOUNTER — APPOINTMENT (OUTPATIENT)
Dept: LAB | Facility: MEDICAL CENTER | Age: 59
End: 2024-02-12

## 2024-02-12 DIAGNOSIS — Z00.6 ENCOUNTER FOR EXAMINATION FOR NORMAL COMPARISON OR CONTROL IN CLINICAL RESEARCH PROGRAM: ICD-10-CM

## 2024-02-12 PROCEDURE — 36415 COLL VENOUS BLD VENIPUNCTURE: CPT

## 2024-03-10 LAB
APOB+LDLR+PCSK9 GENE MUT ANL BLD/T: NOT DETECTED
BRCA1+BRCA2 DEL+DUP + FULL MUT ANL BLD/T: NOT DETECTED
MLH1+MSH2+MSH6+PMS2 GN DEL+DUP+FUL M: NOT DETECTED

## 2024-10-27 ENCOUNTER — APPOINTMENT (EMERGENCY)
Dept: CT IMAGING | Facility: HOSPITAL | Age: 59
End: 2024-10-27
Payer: COMMERCIAL

## 2024-10-27 ENCOUNTER — HOSPITAL ENCOUNTER (EMERGENCY)
Facility: HOSPITAL | Age: 59
Discharge: HOME | End: 2024-10-27
Attending: EMERGENCY MEDICINE
Payer: COMMERCIAL

## 2024-10-27 VITALS
SYSTOLIC BLOOD PRESSURE: 121 MMHG | TEMPERATURE: 98 F | HEART RATE: 64 BPM | RESPIRATION RATE: 18 BRPM | OXYGEN SATURATION: 95 % | DIASTOLIC BLOOD PRESSURE: 69 MMHG

## 2024-10-27 DIAGNOSIS — R42 VERTIGO: Primary | ICD-10-CM

## 2024-10-27 LAB
ALBUMIN SERPL BCG-MCNC: 4.2 G/DL (ref 3.5–5)
ALP SERPL-CCNC: 79 U/L (ref 34–104)
ALT SERPL W P-5'-P-CCNC: 17 U/L (ref 7–52)
ANION GAP SERPL CALCULATED.3IONS-SCNC: 6 MMOL/L (ref 4–13)
AST SERPL W P-5'-P-CCNC: 16 U/L (ref 13–39)
ATRIAL RATE: 65 BPM
BASOPHILS # BLD AUTO: 0.03 THOUSANDS/ΜL (ref 0–0.1)
BASOPHILS NFR BLD AUTO: 1 % (ref 0–1)
BILIRUB SERPL-MCNC: 0.76 MG/DL (ref 0.2–1)
BUN SERPL-MCNC: 10 MG/DL (ref 5–25)
CALCIUM SERPL-MCNC: 9.2 MG/DL (ref 8.4–10.2)
CHLORIDE SERPL-SCNC: 108 MMOL/L (ref 96–108)
CO2 SERPL-SCNC: 26 MMOL/L (ref 21–32)
CREAT SERPL-MCNC: 0.8 MG/DL (ref 0.6–1.3)
EOSINOPHIL # BLD AUTO: 0.04 THOUSAND/ΜL (ref 0–0.61)
EOSINOPHIL NFR BLD AUTO: 1 % (ref 0–6)
ERYTHROCYTE [DISTWIDTH] IN BLOOD BY AUTOMATED COUNT: 13.1 % (ref 11.6–15.1)
GFR SERPL CREATININE-BSD FRML MDRD: 80 ML/MIN/1.73SQ M
GLUCOSE SERPL-MCNC: 102 MG/DL (ref 65–140)
HCT VFR BLD AUTO: 44.5 % (ref 34.8–46.1)
HGB BLD-MCNC: 14.7 G/DL (ref 11.5–15.4)
IMM GRANULOCYTES # BLD AUTO: 0.02 THOUSAND/UL (ref 0–0.2)
IMM GRANULOCYTES NFR BLD AUTO: 0 % (ref 0–2)
LYMPHOCYTES # BLD AUTO: 1.33 THOUSANDS/ΜL (ref 0.6–4.47)
LYMPHOCYTES NFR BLD AUTO: 23 % (ref 14–44)
MCH RBC QN AUTO: 29.6 PG (ref 26.8–34.3)
MCHC RBC AUTO-ENTMCNC: 33 G/DL (ref 31.4–37.4)
MCV RBC AUTO: 90 FL (ref 82–98)
MONOCYTES # BLD AUTO: 0.33 THOUSAND/ΜL (ref 0.17–1.22)
MONOCYTES NFR BLD AUTO: 6 % (ref 4–12)
NEUTROPHILS # BLD AUTO: 4.08 THOUSANDS/ΜL (ref 1.85–7.62)
NEUTS SEG NFR BLD AUTO: 69 % (ref 43–75)
NRBC BLD AUTO-RTO: 0 /100 WBCS
P AXIS: 51 DEGREES
PLATELET # BLD AUTO: 262 THOUSANDS/UL (ref 149–390)
PMV BLD AUTO: 8.9 FL (ref 8.9–12.7)
POTASSIUM SERPL-SCNC: 4.2 MMOL/L (ref 3.5–5.3)
PR INTERVAL: 246 MS
PROT SERPL-MCNC: 6.7 G/DL (ref 6.4–8.4)
QRS AXIS: 40 DEGREES
QRSD INTERVAL: 98 MS
QT INTERVAL: 412 MS
QTC INTERVAL: 428 MS
RBC # BLD AUTO: 4.96 MILLION/UL (ref 3.81–5.12)
SODIUM SERPL-SCNC: 140 MMOL/L (ref 135–147)
T WAVE AXIS: 33 DEGREES
VENTRICULAR RATE: 65 BPM
WBC # BLD AUTO: 5.83 THOUSAND/UL (ref 4.31–10.16)

## 2024-10-27 PROCEDURE — 80053 COMPREHEN METABOLIC PANEL: CPT | Performed by: EMERGENCY MEDICINE

## 2024-10-27 PROCEDURE — 99285 EMERGENCY DEPT VISIT HI MDM: CPT | Performed by: EMERGENCY MEDICINE

## 2024-10-27 PROCEDURE — 36415 COLL VENOUS BLD VENIPUNCTURE: CPT | Performed by: EMERGENCY MEDICINE

## 2024-10-27 PROCEDURE — 85025 COMPLETE CBC W/AUTO DIFF WBC: CPT | Performed by: EMERGENCY MEDICINE

## 2024-10-27 PROCEDURE — 70450 CT HEAD/BRAIN W/O DYE: CPT

## 2024-10-27 PROCEDURE — 93005 ELECTROCARDIOGRAM TRACING: CPT

## 2024-10-27 PROCEDURE — 93010 ELECTROCARDIOGRAM REPORT: CPT | Performed by: INTERNAL MEDICINE

## 2024-10-27 PROCEDURE — 99285 EMERGENCY DEPT VISIT HI MDM: CPT

## 2024-10-27 PROCEDURE — 96374 THER/PROPH/DIAG INJ IV PUSH: CPT

## 2024-10-27 RX ORDER — DIAZEPAM 5 MG/1
2.5 TABLET ORAL EVERY 6 HOURS PRN
Qty: 10 TABLET | Refills: 0 | Status: SHIPPED | OUTPATIENT
Start: 2024-10-27 | End: 2024-11-06

## 2024-10-27 RX ORDER — DIAZEPAM 10 MG/2ML
2.5 INJECTION, SOLUTION INTRAMUSCULAR; INTRAVENOUS ONCE
Status: COMPLETED | OUTPATIENT
Start: 2024-10-27 | End: 2024-10-27

## 2024-10-27 RX ADMIN — DIAZEPAM 2.5 MG: 10 INJECTION, SOLUTION INTRAMUSCULAR; INTRAVENOUS at 13:48

## 2024-10-27 NOTE — ED PROVIDER NOTES
Time reflects when diagnosis was documented in both MDM as applicable and the Disposition within this note       Time User Action Codes Description Comment    10/27/2024  4:13 PM Liz Ramey Add [R42] Vertigo           ED Disposition       ED Disposition   Discharge    Condition   Stable    Date/Time   Sun Oct 27, 2024  4:13 PM    Comment   Betzy Willard discharge to home/self care.                   Assessment & Plan       Medical Decision Making  Please see ED course below for additional details of the Medical Decision Making.       Amount and/or Complexity of Data Reviewed  Labs: ordered.  Radiology: ordered.  ECG/medicine tests:      Details: I personally interpreted the patient's EKG which reveals normal rate, sinus rhythm with first-degree AV block, normal axis, otherwise normal intervals, no ST segment deviation, or pathologic Q waves.  Nonspecific T wave inversion in lead III unchanged from prior.    Risk  Prescription drug management.        ED Course as of 10/27/24 1714   Sun Oct 27, 2024   1401 Patient is describing symptoms of vertigo consistent with her numerous prior episodes of the same.  Symptoms are provoked by movement, resolve when lying completely still.  No distal neurologic symptoms or continuous dizziness to suggest central etiology such as stroke.  I have a very low concern for acute intracranial pathology, however the patient expresses concern for brain mass as her mother was diagnosed with a brain tumor.  Due to patient concern will order CT scan of the head without contrast for further evaluation.  Patient took meclizine at 10 AM without improvement in her symptoms.  Dose of Valium ordered.  Will reassess for improvement.   1617 Patient feels symptomatically improved following Valium.  She is able to ambulate without assistance.  Will discharge home with prescription for small amount of Valium after standard narcotic precautions were discussed.  Patient also has meclizine at home,  she was instructed to try this first when she has symptoms but escalate to Valium if needed.  She has follow-up with the ENT as well as vestibular therapy.  Reasons to return to the emergency department discussed.       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  Patient is followed by ENT, has a tympanostomy tube in her left ear that was placed several months ago.  She presents for evaluation of an episode of sensation of the room spinning.  States it started this morning when she woke up and moved her head.  Dizziness resolves when she lies completely still or closes her eyes, recurs with any head or eye movement.  Last episode of vertigo was 1 week ago, lasted intermittently for about 9 hours before resolving.  She has meclizine that she took at home with last dose at 10 AM without improvement in her symptoms.  She denies any headache or vision changes.  She is currently having difficulty ambulating due to severe dizziness when she goes to get up out of bed.  Patient was treated with a Z-Ag for a sinus infection last week, states that her symptoms of completely resolved and she denies any fevers or chills.        Chief Complaint   Patient presents with    Dizziness     Has vertigo and pt has been experiencing episodes more frequently. Reports no vomiting. Took meclozine about an hr ago. Pt also has a tube in L ear.        Past Medical History:   Diagnosis Date    Colon polyp     Disease of thyroid gland     Dizziness 6/22    Ear problems     Hyperlipidemia     Hypertension     Postoperative hypothyroidism     Tinnitus     Vertigo       Past Surgical History:   Procedure Laterality Date    COLONOSCOPY       MYRINGOTOMY W/ TUBES Left 06/17/2024    office procedure - Dr. Luque    SHOULDER SURGERY      THYROIDECTOMY        Family History   Problem Relation Age of Onset    Brain cancer Mother 82    Cancer Mother         Brain tumor    Hypertension Mother     Colon cancer Father 85    Cancer Father         Colon    Hypertension Father     No Known Problems Sister     No Known Problems Maternal Grandmother     No Known Problems Maternal Grandfather     No Known Problems Paternal Grandmother     No Known Problems Paternal Grandfather     No Known Problems Son     No Known Problems Son     Breast cancer Maternal Aunt 45      Social History     Tobacco Use    Smoking status: Never    Smokeless tobacco: Never   Vaping Use    Vaping status: Never Used   Substance Use Topics    Alcohol use: Not Currently    Drug use: Not Currently      E-Cigarette/Vaping    E-Cigarette Use Never User       E-Cigarette/Vaping Substances      I have reviewed and agree with the history as documented.     HPI    Review of Systems   Constitutional:  Negative for chills and fever.   HENT:  Negative for congestion.    Eyes:  Negative for visual disturbance.   Respiratory:  Negative for cough and shortness of breath.    Cardiovascular:  Negative for chest pain and leg swelling.   Gastrointestinal:  Negative for abdominal pain, diarrhea, nausea and vomiting.   Genitourinary:  Negative for dysuria and frequency.   Musculoskeletal:  Negative for arthralgias, back pain, neck pain and neck stiffness.   Skin:  Negative for rash.   Neurological:  Positive for dizziness. Negative for speech difficulty, weakness, numbness and headaches.   Psychiatric/Behavioral:  Negative for agitation, behavioral problems and confusion.    All other systems reviewed and are negative.          Objective       ED Triage Vitals [10/27/24 1206]   Temperature Pulse Blood Pressure Respirations SpO2 Patient Position - Orthostatic VS   98 °F (36.7 °C) 65 167/92 18 99 % Sitting       Temp Source Heart Rate Source BP Location FiO2 (%) Pain Score    Oral Monitor Right arm -- --      Vitals      Date and Time Temp Pulse SpO2 Resp BP Pain Score FACES Pain Rating User   10/27/24 1430 -- 64 95 % 18 121/69 -- -- AW   10/27/24 1230 -- 61 98 % 18 140/77 -- -- DO   10/27/24 1206 98 °F (36.7 °C) 65 99 % 18 167/92 -- -- AL            Physical Exam  Constitutional:       General: She is not in acute distress.     Appearance: She is well-developed. She is not diaphoretic.   HENT:      Head: Normocephalic and atraumatic.      Right Ear: Tympanic membrane and external ear normal.      Left Ear: External ear normal.      Ears:      Comments: Left tympanostomy tube in place without erythema or drainage     Nose: Nose normal.   Eyes:      Extraocular Movements: Extraocular movements intact.      Conjunctiva/sclera: Conjunctivae normal.      Pupils: Pupils are equal, round, and reactive to light.      Comments: Fatigable end-gaze leftward horizontal nystagmus   Cardiovascular:      Rate and Rhythm: Normal rate and regular rhythm.      Heart sounds: Normal heart sounds. No murmur heard.     No friction rub. No gallop.   Pulmonary:      Effort: Pulmonary effort is normal. No respiratory distress.      Breath sounds: Normal breath sounds. No wheezing or rales.   Abdominal:      General: Bowel sounds are normal. There is no distension.      Palpations: Abdomen is soft.      Tenderness: There is no abdominal tenderness. There is no guarding.   Musculoskeletal:         General: No deformity. Normal range of motion.      Cervical back: Normal range of motion and neck supple.   Skin:     General: Skin is warm and dry.   Neurological:      Mental Status: She is alert and oriented to person, place, and time.      Motor: No abnormal muscle tone.      Comments: Face symmetric, tongue midline, 5/5 strength in the proximal and distal upper and lower extremities bilaterally with intact sensation to light touch throughout.  CN  II-XII intact. Normal finger-to-nose, rapid alternating movements, and heel-to-shin bilaterally.  Normal speech. No pronator drift.  Unable to assess gait as patient becomes extremely vertiginous with attempts to get up and out of bed.  She has no truncal ataxia when sitting up in bed.           Results Reviewed       Procedure Component Value Units Date/Time    Comprehensive metabolic panel [[id number]] Collected: 10/27/24 1348    Lab Status: Final result Specimen: Blood from Arm, Left Updated: 10/27/24 1413     Sodium 140 mmol/L      Potassium 4.2 mmol/L      Chloride 108 mmol/L      CO2 26 mmol/L      ANION GAP 6 mmol/L      BUN 10 mg/dL      Creatinine 0.80 mg/dL      Glucose 102 mg/dL      Calcium 9.2 mg/dL      AST 16 U/L      ALT 17 U/L      Alkaline Phosphatase 79 U/L      Total Protein 6.7 g/dL      Albumin 4.2 g/dL      Total Bilirubin 0.76 mg/dL      eGFR 80 ml/min/1.73sq m     Narrative:      National Kid[employer] Disease Foundation guidelines for Chronic Kidney Disease (CKD):     Stage 1 with normal or high GFR (GFR > 90 mL/min/1.73 square meters)    Stage 2 Mild CKD (GFR = 60-89 mL/min/1.73 square meters)    Stage 3A Moderate CKD (GFR = 45-59 mL/min/1.73 square meters)    Stage 3B Moderate CKD (GFR = 30-44 mL/min/1.73 square meters)    Stage 4 Severe CKD (GFR = 15-29 mL/min/1.73 square meters)    Stage 5 End Stage CKD (GFR <15 mL/min/1.73 square meters)  Note: GFR calculation is accurate only with a steady state creatinine    CBC and differential [[id number]] Collected: 10/27/24 1348    Lab Status: Final result Specimen: Blood from Arm, Left Updated: 10/27/24 1357     WBC 5.83 Thousand/uL      RBC 4.96 Million/uL      Hemoglobin 14.7 g/dL      Hematocrit 44.5 %      MCV 90 fL      MCH 29.6 pg      MCHC 33.0 g/dL      RDW 13.1 %      MPV 8.9 fL      Platelets 262 Thousands/uL      nRBC 0 /100 WBCs      Segmented % 69 %      Immature Grans % 0 %      Lymphocytes % 23 %      Monocytes % 6 %      Eosinophils  Relative 1 %      Basophils Relative 1 %      Absolute Neutrophils 4.08 Thousands/µL      Absolute Immature Grans 0.02 Thousand/uL      Absolute Lymphocytes 1.33 Thousands/µL      Absolute Monocytes 0.33 Thousand/µL      Eosinophils Absolute 0.04 Thousand/µL      Basophils Absolute 0.03 Thousands/µL             CT head without contrast   Final Interpretation by Torrie Hernandez MD (10/27 0380)      No acute intracranial abnormality.      Cerebellar tonsil ectopia again demonstrated, grossly unchanged.                  Workstation performed: [id number]             Procedures    ED Medication and Procedure Management   Prior to Admission Medications   Prescriptions Last Dose Informant Patient Reported? Taking?   azelastine (ASTELIN) 0.1 % nasal spray   No No   Sig: INSTILL 2 SPRAYS INTO EACH NOSTRIL DAILY   doxycycline monohydrate (MONODOX) 100 mg capsule   Yes No   Sig: Take 100 mg by mouth 2 (two) times a day   ferrous sulfate 325 (65 Fe) mg tablet   Yes No   levothyroxine 112 mcg tablet   Yes No   Sig: Take 112 mcg by mouth daily   levothyroxine 125 mcg tablet   Yes No   meclizine (ANTIVERT) 25 mg tablet   No No   Sig: TAKE ONE TABLET BY MOUTH EVERY 8 HOURS AS NEEDED FOR DIZZINESS   metoprolol tartrate (LOPRESSOR) 100 mg tablet   Yes No   Sig: Take 100 mg by mouth 2 (two) times a day   predniSONE 20 mg tablet   No No   Sig: Take 3 tabs po x5days, then take 2 tabs po x5 days, then take 1 tab po x5days   simvastatin (ZOCOR) 40 mg tablet   Yes No   Sig: Take 40 mg by mouth daily   valsartan (DIOVAN) 80 mg tablet   Yes No   Sig: Take 80 mg by mouth every evening      Facility-Administered Medications: None     Discharge Medication List as of 10/27/2024  4:17 PM        START taking these medications    Details   diazepam (VALIUM) 5 mg tablet Take 0.5 tablets (2.5 mg total) by mouth every 6 (six) hours as needed (vertigo) for up to 10 days, Starting Sun 10/27/2024, Until Wed 11/6/2024 at 2359, Normal           CONTINUE these  medications which have NOT CHANGED    Details   azelastine (ASTELIN) 0.1 % nasal spray INSTILL 2 SPRAYS INTO EACH NOSTRIL DAILY, Starting Thu 10/19/2023, Normal      doxycycline monohydrate (MONODOX) 100 mg capsule Take 100 mg by mouth 2 (two) times a day, Starting Tue 7/25/2023, Historical Med      ferrous sulfate 325 (65 Fe) mg tablet Historical Med      !! levothyroxine 112 mcg tablet Take 112 mcg by mouth daily, Starting Sat 10/29/2022, Historical Med      !! levothyroxine 125 mcg tablet Starting Wed 2/10/2021, Historical Med      meclizine (ANTIVERT) 25 mg tablet TAKE ONE TABLET BY MOUTH EVERY 8 HOURS AS NEEDED FOR DIZZINESS, Normal      metoprolol tartrate (LOPRESSOR) 100 mg tablet Take 100 mg by mouth 2 (two) times a day, Starting Fri 7/2/2021, Historical Med      predniSONE 20 mg tablet Take 3 tabs po x5days, then take 2 tabs po x5 days, then take 1 tab po x5days, Normal      simvastatin (ZOCOR) 40 mg tablet Take 40 mg by mouth daily, Starting Tue 7/6/2021, Historical Med      valsartan (DIOVAN) 80 mg tablet Take 80 mg by mouth every evening, Starting Wed 7/7/2021, Historical Med       !! - Potential duplicate medications found. Please discuss with provider.        No discharge procedures on file.  ED SEPSIS DOCUMENTATION   Time reflects when diagnosis was documented in both MDM as applicable and the Disposition within this note       Time User Action Codes Description Comment    10/27/2024  4:13 PM Liz Ramey Add [R42] Vertigo                  Liz Ramey MD  10/27/24 7071

## 2024-11-04 ENCOUNTER — EVALUATION (OUTPATIENT)
Dept: PHYSICAL THERAPY | Facility: MEDICAL CENTER | Age: 59
End: 2024-11-04
Payer: COMMERCIAL

## 2024-11-04 VITALS — DIASTOLIC BLOOD PRESSURE: 94 MMHG | SYSTOLIC BLOOD PRESSURE: 170 MMHG

## 2024-11-04 DIAGNOSIS — R42 DIZZINESS: ICD-10-CM

## 2024-11-04 PROCEDURE — 97162 PT EVAL MOD COMPLEX 30 MIN: CPT | Performed by: PHYSICAL THERAPIST

## 2024-11-04 NOTE — PROGRESS NOTES
PT Evaluation      Today's date: 2024  Patient name: Betzy Willard  : 1965  MRN: [id number]  Referring provider: Ovidio Luque DO  Dx:   Encounter Diagnosis     ICD-10-CM    1. Dizziness  R42 Ambulatory referral to Physical Therapy            Assessment  Assessment details: Patient is a 59 y.o. Female who presents to skilled outpatient PT with complaints of dizziness. Patient reports that she has had bouts of dizziness starting approximately 10 years ago recently worsening over the last 3 weeks. She states that she had 2 episodes of spinning dizziness along with ongoing dizziness and balance issues. PMH significant for left ear tinnitus and partial hearing loss. Upon arrival patient displays minimal limitations with her cervical extension but reports dizziness during flexion and extension. Integrity testing was unremarkable. During positional testing patient reports increased subjective dizziness with all positions however no visible nystagmus was observed. Discussed dizziness symptoms and vestibular pathologies with patient along with different treatment methods. Due to time constraints plan to continue with oculomotor screening and balance testing next session. Patient will benefit from skilled PT services to reduce her dizziness and help her return to her PLOF.     Patient verbalized understanding of POC.    Please contact me if you have any questions or recommendations. Thank you for the referral and the opportunity to share in Betzy Willard's care.      Cut off score   All date taken from [facility] Neuro Section or Rehab Measures    DGI:  MDC for Vestibular Disorders: 4 points  MDC for Geriatrics/Community Dwelling Older Adults: 3 Points  Falls risk cut off: <19/24    FGA:  MCID: 4 points  Geriatrics/Community Dwelling Older Adults: </= 22/30 fall risk  Geriatrics/Community Dwelling Older Adults: </= 20/30 unexplained falls  in the next 6 months  Parkinsons: </= 18/30 fall risk    mCTSIB (normed on ages 20-60, lower number is less sway or better static balance)  Eyes open firm surface (norm 0.21-0.48)  Eyes closed firm surface (norm 0.48-0.99)  Eyes open foam surface (norm 0.38-0.71)  Eyes closed foam surface (norm 0.70-2.22)    DHI:  0-39: low perception of handicap  40-69: moderate perception of handicap  : severe perception of handicap  > 60: increased risk for falls    Joint Position Error Testing (JPET):  > 4.5 degrees: abnormal joint proprioception        Impairments: Abnormal muscle tone, abnormal or restricted ROM, activity intolerance, impaired balance, impaired physical strength, lacks appropriate HEP, poor posture, safety issue  Understanding of Dx/Px/POC: good  Prognosis: good      Goals    Vestibular Short Term Goals (4 weeks):  - Patient will be independent with simple HEP  - Patient will tolerate 60 seconds of oculomotor exercises with minimal increase in symptoms  - Patient will be able to tolerate 30 seconds with eyes closed on foam surface without any loss of balance demonstrating improvement in vestibular system  - Patient will improve FGA score by 4 points per MDC to promote improved safety with dynamic tasks    Vestibular Long Term Goals (12 weeks):  - Patient will be independent with complex HEP  - Patient will tolerate >=2 minutes of oculomotor exercises to facilitate return to reading and computer work  - Patient will demonstrate ability to perform HT in gait without veering  - Patient will score low risk for falls with FGA test with score of 23/30 or higher per current research data  - Patient will report baseline dizziness of 1/10 or less   - Patient will report 2/10 dizziness or less with visual stimulating surround with duration of 2 minutes   - Patient will report subjective improvement to 90% or higher to promote return to PLOF      Plan  Plan details: oculomotor screen, FGA  Patient would benefit  from: Skilled PT  Planned modality interventions: Biofeedback, Cryotherapy, TENS, Thermotherapy  Planned therapy interventions: balance, balance/WB training, breathing training, body mechanics training, coordination, flexibility, functional ROM exercises, gait training, HEP, manual therapy, motor coordination training, neuromuscular re-education, patient education, postural training, strengthening, stretching, therapeutic activities, therapeutic exercises  Frequency: 2x/wk  Duration in weeks: 12  Plan of Care beginning date: 2024  Plan of Care expiration date: 12 weeks - 2025  Treatment plan discussed with: Patient        Subjective Evaluation    History of Present Illness  Mechanism of injury: Patient reports that she has been having dizziness, ringing in her ear, ear pain, and balance issues. She states that approximately 10 years ago she began having issues with vertigo. She has recently noticed a significant worsening of her symptoms over the last 3 months. Patient states that she had 2 episodes of room spinning dizziness and reports her dizziness has been very bad over the last 2 days.       Dizziness Subjective  How long does dizziness last: up to 1 day  How would you describe the dizziness: spinning dizziness, off-balance  Rolling in bed: Yes  Supine to/from sit: No  Recent hearing loss: No  Tinnitus: Yes  Aural fullness/ear pain: No  Vision changes: No  History of recent viral infections: No  History of migraines: No    Red Flag Screen  - Numbness: No  - Tingling: No  - Weakness: No    Pain  Current pain ratin/10    Social Support  Steps to enter house: 10 steps  Stairs in house: none   Lives in: apartment  Lives with:     Employment status:     Treatments  Previous treatment: PT (orthopedic)      Objective     Vestibular Objective  Cervical Spine AROM:  - Flexion: WFL  increased dizziness  - Extension: minimal limitation  increased dizziness  - R Rotation: WFL no pain  - L  "Rotation: WFL no pain  - R Lateral Flexion: WFL  increased dizziness  - L Lateral Flexion: WFL no pain    Integrity Testing  - mVBI: WNL  - Sharp Jerilyn: WNL  - Alar Stability Test: WNL    Oculomotor Screen--- NEXT SESSION  - Baseline Symptoms: /10  - Baseline Observation:   - Gaze Holding Nystagmus:  Dizziness: /10, Observation:   - Spontaneous Nystagmus Room Light:  Dizziness: /10, Observation:   - Smooth Pursuits (central):  Dizziness: /10, Observation:   - Saccades (central):  Dizziness: /10, Observation:   - Near Point Convergence (normal: < 4\"/10 cm - central):  Dizziness: /10, Observation:   - VORx1:  Dizziness: /10, Observation:   - VOR Cancel (central):  Dizziness: /10, Observation:   - Head Thrust (moderate to severe hypofunction):  Dizziness: /10, Observation:   - Head Shaking Test (mild hypofunction):  Dizziness: /10, Observation:   - Dynamic Visual Acuity (2 Hz):   Static Head: 20/ Line   Dynamic Head: 20/ Line   Difference in number of lines:  (abnormal if 3 or >)      BPPV Screen  - L [place]-Hallpike: (-) nystagmus, (+) subjective dizziness  - R [place]-Hallpike: (-) nystagmus, (+) subjective dizziness  - L Horizontal Roll: (-) nystagmus, (+) subjective dizziness  - R Horizontal Roll: (-) nystagmus, (+) subjective dizziness        Outcome Measures Initial Eval          mCTSIB  - FTEO (firm)  - FTEC (firm)  - FTEO (foam)  - FTEC (foam)    sec   sec   sec   sec        DGI /24        FGA /30        DHI /100        JPET  degrees                                                          Precautions:   Past Medical History:   Diagnosis Date    Colon polyp     Disease of thyroid gland     Dizziness 6/22    Ear problems     Hyperlipidemia     Hypertension     Postoperative hypothyroidism     Tinnitus     Vertigo      "

## 2024-11-06 ENCOUNTER — OFFICE VISIT (OUTPATIENT)
Dept: PHYSICAL THERAPY | Facility: MEDICAL CENTER | Age: 59
End: 2024-11-06
Payer: COMMERCIAL

## 2024-11-06 DIAGNOSIS — R42 DIZZINESS: Primary | ICD-10-CM

## 2024-11-06 PROCEDURE — 97112 NEUROMUSCULAR REEDUCATION: CPT | Performed by: PHYSICAL THERAPIST

## 2024-11-06 PROCEDURE — 97530 THERAPEUTIC ACTIVITIES: CPT | Performed by: PHYSICAL THERAPIST

## 2024-11-06 NOTE — PROGRESS NOTES
Daily Note     Today's date: 2024  Patient name: Betzy Willard  : 1965  MRN: [id number]  Referring provider: Ovidio Luque DO  Dx:   Encounter Diagnosis     ICD-10-CM    1. Dizziness  R42                      Subjective: Patient reports that her dizziness rates 2/10 upon arrival      Objective: See treatment diary below    TA  Objective measures: see below  Patient education: see below    Oculomotor Screen   Dizziness 2 / 10     -Smooth Pursuits- Central   Normal, Dizziness 0/10    -Gaze holding nystagmus-   Abnormal, Dizziness 3/10    -Spontaneous nystagmus room light-  Normal, Dizziness 0/10    -Near Point Convergence- Central   Normal, 4 Inches/CM ( 4 inch/ 6CM norm)   Dizziness 0/10    -Saccades- Central   Horizontal   Abnormal, Dizziness 7/10  Vertical   Normal, Dizziness 0/10    -VOR Screen / Motion Sensitivity-   Horizontal   Abnormal, Dizziness 5/10  Vertical   Abnormal, Dizziness 7/10    -VOR Cancel- Central   Horizontal   Abnormal, Dizziness 4/10  Vertical   Abnormal, Dizziness 2/10      -Head Thrust-  Horizontal  NOT TESTED    NMR:  - Seated VOR HT: 3x H/V, 2 sets    HEP: Seated VOR HT      Assessment: Patient tolerated treatment well. Continued with testing from evaluation with patient reporting increased dizziness/off-balance symptoms with gaze holding, horizontal saccades testing, VOR x1 and VOR Cx testing. She scored 32/100 on DHI suggesting low perception of handicap and 23/30 on FGA suggesting she is borderline increased risk of falls. Spent significant time educating patient on vestibular pathologies and treatments, along with questions from the patient. Began patient with seated VOR x1 exercises, introducing head movements while maintaining gaze on object, reporting most symptomatic with vertical head movements. Provided patient with HEP, reviewed exercise, and discussed safety of exercising at home, patient verbalized agreement. Patient will benefit from skilled PT services  to reduce her dizziness and help her return to her PLOF.       Plan: Continue per plan of care.          Outcome Measures   11/6/2024        mCTSIB  - FTEO (firm)  - FTEC (firm)  - FTEO (foam)  - FTEC (foam)    30 sec   30 sec   30 sec   3 sec        DGI /24        FGA 23/30        DHI 32/100 (low)        JPET  degrees

## 2024-11-11 ENCOUNTER — OFFICE VISIT (OUTPATIENT)
Dept: PHYSICAL THERAPY | Facility: MEDICAL CENTER | Age: 59
End: 2024-11-11
Payer: COMMERCIAL

## 2024-11-11 DIAGNOSIS — R42 DIZZINESS: Primary | ICD-10-CM

## 2024-11-11 PROCEDURE — 97112 NEUROMUSCULAR REEDUCATION: CPT | Performed by: PHYSICAL THERAPIST

## 2024-11-11 PROCEDURE — 97530 THERAPEUTIC ACTIVITIES: CPT | Performed by: PHYSICAL THERAPIST

## 2024-11-11 NOTE — PROGRESS NOTES
Daily Note     Today's date: 2024  Patient name: Betzy Willard  : 1965  MRN: [id number]  Referring provider: Ovidio Luque DO  Dx:   Encounter Diagnosis     ICD-10-CM    1. Dizziness  R42                      Subjective: Patient reports no changes since her last visit      Objective: See treatment diary below    TA:  - Patient education: grounding techniques, deep breathing techniques    NMR:  - Standing VOR HT: 5x H/V, 2 sets (3/10 D)  - Ambulation w/ HT: 3 laps x 20 ft (1/10 D)  - Ambulation w/ HN: 3 laps x 20 ft (1-2/10 D)  - Standing VOR x1: 6x horizontal (4-5/10 D)  - Standing VOR x1: 6x vertical (4-5/10 D)      HEP: Seated VOR HT/HN      Assessment: Patient tolerated treatment well. Began patient with head turning exercises, increasing to 5 reps today with minimal increase in symptoms. Patient continues to have increased dizziness/off-balance symptoms when moving head in vertical direction, specifically in downward direction. Provided patient with education regarding grounding and deep breathing techniques, encouraging patient to practice to see if symptoms reduce. Patient will benefit from skilled PT services to reduce her dizziness and help her return to her PLOF.       Plan: Continue per plan of care.          Outcome Measures   2024        mCTSIB  - FTEO (firm)  - FTEC (firm)  - FTEO (foam)  - FTEC (foam)    30 sec   30 sec   30 sec   3 sec        DGI /24        FGA 23/30        DHI 32/100 (low)        JPET  degrees

## 2024-11-13 ENCOUNTER — OFFICE VISIT (OUTPATIENT)
Dept: PHYSICAL THERAPY | Facility: MEDICAL CENTER | Age: 59
End: 2024-11-13
Payer: COMMERCIAL

## 2024-11-13 DIAGNOSIS — R42 DIZZINESS: Primary | ICD-10-CM

## 2024-11-13 PROCEDURE — 97530 THERAPEUTIC ACTIVITIES: CPT | Performed by: PHYSICAL THERAPIST

## 2024-11-13 PROCEDURE — 97112 NEUROMUSCULAR REEDUCATION: CPT | Performed by: PHYSICAL THERAPIST

## 2024-11-13 NOTE — PROGRESS NOTES
Daily Note     Today's date: 2024  Patient name: Betzy Willard  : 1965  MRN: [id number]  Referring provider: Ovidio Luque DO  Dx:   Encounter Diagnosis     ICD-10-CM    1. Dizziness  R42                      Subjective: Patient reports 2-3/10 imbalance feelings today      Objective: See treatment diary below    TA:  - Patient education    NMR:  - Supine HT/HN: 10x ea  - Supine HT w/ EC: 10x 1/10D  - Log roll R/L: 2x, 3 minute hold, 4/10 D  - Seated horizontal VOR x1 (10x, self-paced): 3/10 D  - Seated vertical VOR x1 (10x, self-paced): 4/10 D  - Seated diagonal VOR x1 (5x, self-paced): 2/10 D  - Seated diagonal VOR x1 (5x, self-paced): 4/10 D        HEP: Seated VOR HT/HN      Assessment: Patient tolerated treatment fairly. Attempted head turning exercises in supine however patient reported increased dizziness/imbalance feelings due to supine positioning. Reduced dizziness reported with horizontal head turns after completing log rolls. Reduced dizziness also reported with EC to reduce visual input. Continued to educate patient on dizziness symptoms and provided patient with handouts on VOR and how stress/anxiety can effect dizziness. Patient will benefit from skilled PT services to reduce her dizziness and help her return to her PLOF.       Plan: Continue per plan of care.          Outcome Measures   2024        mCTSIB  - FTEO (firm)  - FTEC (firm)  - FTEO (foam)  - FTEC (foam)    30 sec   30 sec   30 sec   3 sec        DGI /24        FGA 23/30        DHI 32/100 (low)        JPET  degrees

## 2024-11-18 ENCOUNTER — OFFICE VISIT (OUTPATIENT)
Dept: PHYSICAL THERAPY | Facility: MEDICAL CENTER | Age: 59
End: 2024-11-18
Payer: COMMERCIAL

## 2024-11-18 DIAGNOSIS — R42 DIZZINESS: Primary | ICD-10-CM

## 2024-11-18 PROCEDURE — 97112 NEUROMUSCULAR REEDUCATION: CPT | Performed by: PHYSICAL THERAPIST

## 2024-11-18 NOTE — PROGRESS NOTES
Daily Note     Today's date: 2024  Patient name: Betzy Willard  : 1965  MRN: [id number]  Referring provider: Ovidio Luque DO  Dx:   Encounter Diagnosis     ICD-10-CM    1. Dizziness  R42                        Subjective: Patient reports 2/10 imbalance feelings today      Objective: See treatment diary below      NMR:  - Pick/place squigz on wall: 20x right side  - Pick/place squigz on wall: 20x left side  - Pick/place squigz on wall: 20x w/ 180 degree turn  - Ambulation w/ self ball toss: 2 laps x 20 ft  - Standing VOR x1: 5x H/V (3/10 D)  - Seated VOR Cx: 3x H/V (5/10 D)        HEP: Seated VOR HT/HN      Assessment: Patient tolerated treatment fairly. Squigz utilized today to encourage horizontal and vertical head turning while focusing on external cueing for appropriate placement. Attempted VOR Cx today however patient only able to tolerate minimal reps due to increased dizziness/imbalance symptoms. Plan to continue to progress as tolerated. Patient will benefit from skilled PT services to reduce her dizziness and help her return to her PLOF.       Plan: Continue per plan of care.          Outcome Measures   2024        mCTSIB  - FTEO (firm)  - FTEC (firm)  - FTEO (foam)  - FTEC (foam)    30 sec   30 sec   30 sec   3 sec        DGI /24        FGA 23/30        DHI 32/100 (low)        JPET  degrees

## 2024-11-20 ENCOUNTER — OFFICE VISIT (OUTPATIENT)
Dept: PHYSICAL THERAPY | Facility: MEDICAL CENTER | Age: 59
End: 2024-11-20
Payer: COMMERCIAL

## 2024-11-20 DIAGNOSIS — R42 DIZZINESS: Primary | ICD-10-CM

## 2024-11-20 PROCEDURE — 97110 THERAPEUTIC EXERCISES: CPT | Performed by: PHYSICAL THERAPIST

## 2024-11-20 PROCEDURE — 97140 MANUAL THERAPY 1/> REGIONS: CPT | Performed by: PHYSICAL THERAPIST

## 2024-11-20 PROCEDURE — 97112 NEUROMUSCULAR REEDUCATION: CPT | Performed by: PHYSICAL THERAPIST

## 2024-11-20 NOTE — PROGRESS NOTES
Daily Note     Today's date: 2024  Patient name: Betzy Willard  : 1965  MRN: [id number]  Referring provider: Ovidio Luque DO  Dx:   Encounter Diagnosis     ICD-10-CM    1. Dizziness  R42                        Subjective: Patient reports increased dizziness and sinus pressure today      Objective: See treatment diary below    Manual:  - Sinus release  - L SCM TPR    TE:  - UT stretch: 30 sec 2x  - LS stretch: 30 sec ea  - Cervical SNAG: 5x 5 sec holds      NMR:  - Vertical head turns w/ towel pressure: 10x  - Horizontal head turns w/ towel pressure: 10x      HEP: Seated VOR HT/HN, cervical SNAG      Assessment: Patient tolerated treatment well. Due to patient reports of sinus pressure, began patient with manual sinus release and TPR to L SCM with patient reporting reduced pressure and tinnitus post-treatment. Cervical stretches added today to improve ROM and reduce dizziness. During head turns, patient reports reduction in symptoms when towel overpressure added. Plan to complete JPET next session. Patient will benefit from skilled PT services to reduce her dizziness and help her return to her PLOF.       Plan: Continue per plan of care.          Outcome Measures   2024        mCTSIB  - FTEO (firm)  - FTEC (firm)  - FTEO (foam)  - FTEC (foam)    30 sec   30 sec   30 sec   3 sec        DGI /24        FGA         DHI 32/100 (low)        JPET  degrees

## 2024-11-25 ENCOUNTER — OFFICE VISIT (OUTPATIENT)
Dept: PHYSICAL THERAPY | Facility: MEDICAL CENTER | Age: 59
End: 2024-11-25
Payer: COMMERCIAL

## 2024-11-25 DIAGNOSIS — R42 DIZZINESS: Primary | ICD-10-CM

## 2024-11-25 PROCEDURE — 97110 THERAPEUTIC EXERCISES: CPT | Performed by: PHYSICAL THERAPIST

## 2024-11-25 PROCEDURE — 97112 NEUROMUSCULAR REEDUCATION: CPT | Performed by: PHYSICAL THERAPIST

## 2024-11-25 NOTE — PROGRESS NOTES
Daily Note     Today's date: 2024  Patient name: Betzy Willard  : 1965  MRN: [id number]  Referring provider: Ovidio Luqeu DO  Dx:   Encounter Diagnosis     ICD-10-CM    1. Dizziness  R42                        Subjective: Patient reports she is doing well today       Objective: See treatment diary below      TE:  - UT stretch: 30 sec 5x  - LS stretch: 30 sec 5x  - Chin tucks: 20x 3 sec holds      NMR:  JPET testing: >4.5 degree error w/ cervical flexion/extension/R rotation  JPET training: 15 minutes  - Ambulation w/ horizontal HT: 3 laps x 20 ft, 1/10 D  - Ambulation w/ vertical HT: 3 laps x 20 ft, 1/10 D        HEP: Seated VOR HT/HN, cervical SNAG      Assessment: Patient tolerated treatment well. Continued with cervical stretching while adding chin tucks to improve upright posture and prevent increase in symptoms. JPET completed today with patient displaying >4.5 degree error with cervical flexion, extension, and right rotation suggesting possible cervical proprioceptive deficits. During ambulation with horizontal and vertical HT patient reports reduced dizziness and reduced lateral sway/imbalance. Patient will benefit from skilled PT services to reduce her dizziness and help her return to her PLOF.       Plan: Continue per plan of care.          Outcome Measures   2024        mCTSIB  - FTEO (firm)  - FTEC (firm)  - FTEO (foam)  - FTEC (foam)    30 sec   30 sec   30 sec   3 sec        DGI /24        FGA 23/30        DHI 32/100 (low)        JPET  degrees

## 2024-11-27 ENCOUNTER — OFFICE VISIT (OUTPATIENT)
Dept: PHYSICAL THERAPY | Facility: MEDICAL CENTER | Age: 59
End: 2024-11-27
Payer: COMMERCIAL

## 2024-11-27 DIAGNOSIS — R42 DIZZINESS: Primary | ICD-10-CM

## 2024-11-27 PROCEDURE — 97112 NEUROMUSCULAR REEDUCATION: CPT | Performed by: PHYSICAL THERAPIST

## 2024-11-27 NOTE — PROGRESS NOTES
Daily Note     Today's date: 2024  Patient name: Betzy Willard  : 1965  MRN: [id number]  Referring provider: Ovidio Luque DO  Dx:   Encounter Diagnosis     ICD-10-CM    1. Dizziness  R42                        Subjective: Patient reports 0/10 symptoms upon arrival      Objective: See treatment diary below      TE:  - UT stretch: 30 sec 5x  - LS stretch: 30 sec 5x  - Chin tucks: 20x 3 sec holds      NMR:  VOR Cx (seated, 45 sec)  - H: 1.5/10 D, 2/10 D  - D: 10 D, 2/10 D    VOR x1 (seated, 45 sec)  - H: 4/10  - V: 4/10    FTEO/EC on foam: 30 sec, 2x ea  Tandem EO/EC on foam: 5-10 sec intervals, 10x  Marching w/ opposite hand/knee taps FWD/BWD: 2 laps x 20 ft  Tandem ambulation: 10 ft x2  Sidestepping on foam: 3 laps  Sidestepping on foam w/ cone taps: 3 laps  Tandem ambulation on foam: 3 laps        HEP: Seated VOR HT/HN, cervical SNAG, VOR Cx, Tandem ambulation       Assessment: Patient tolerated treatment well. Continued with vestibular exercises, focusing on increasing duration today with patient tolerating up to 45 seconds before needing rest break. Increased static and dynamic balance challenge today, completing with unstable foam surfaces and with narrow CHRIS to increase difficulty. Any LOB was self corrected with appropriate use of stepping strategy. Plan to progress VOR exercises as tolerated. Patient will benefit from skilled PT services to reduce her dizziness and help her return to her PLOF.       Plan: Continue per plan of care.          Outcome Measures   2024        mCTSIB  - FTEO (firm)  - FTEC (firm)  - FTEO (foam)  - FTEC (foam)    30 sec   30 sec   30 sec   3 sec        DGI /24        FGA 23/30        DHI 32/100 (low)        JPET  degrees

## 2024-12-02 ENCOUNTER — OFFICE VISIT (OUTPATIENT)
Dept: PHYSICAL THERAPY | Facility: MEDICAL CENTER | Age: 59
End: 2024-12-02
Payer: COMMERCIAL

## 2024-12-02 DIAGNOSIS — R42 DIZZINESS: Primary | ICD-10-CM

## 2024-12-02 PROCEDURE — 97112 NEUROMUSCULAR REEDUCATION: CPT

## 2024-12-02 NOTE — PROGRESS NOTES
"Daily Note   Progress Note next session    Today's date: 2024  Patient name: Betzy Willard  : 1965  MRN: [id number]  Referring provider: Ovidio Luque DO  Dx:   Encounter Diagnosis     ICD-10-CM    1. Dizziness  R42             Start Time: 1700  Stop Time: 1740  Total time in clinic (min): 40 minutes    Subjective: Patient reports 0/10 symptoms upon arrival, patient reports that she has been feeling \"better\" over the past few days.       Objective: See treatment diary below    NMR:  VOR Cx (seated, 60 sec)  - H: 3/10  - CW: 3/10  - CCW: 1/10  - V: 1/10    VOR x1 (seated, 60 sec)  - H: 4/10  - V: 2/10    Sidestepping on foam: 10 laps  Tandem stepping on foam: 10 laps  Lateral cone taps- 6 laps     Squigz- firm ground, rotation and place on mirror, then remove- 5' 010 dizziness    FTEO on foam- 30 seconds 1 set  FTEC on foam- 30 seconds    FAEO with head turns on foam- horizontal and vertical- 20 reps total     HEP: Seated VOR HT/HN, cervical SNAG, VOR Cx, Tandem ambulation       Assessment: Patient tolerated treatment well. Continued with vestibular exercises, focusing on increasing duration today with patient tolerating up to 60 seconds before needing rest break. Increased static and dynamic balance challenge today, completing with unstable foam surfaces with added dynamic head turns today. Progressions noted with more challenging vestibular interventions today, patient tolerated VOR cancel interventions with minimal to moderate Plan to progress VOR exercises as tolerated, re-evaluation/progress note next session. Patient will benefit from skilled PT services to reduce her dizziness and help her return to her PLOF.       Plan: Continue per plan of care.          Outcome Measures   2024        mCTSIB  - FTEO (firm)  - FTEC (firm)  - FTEO (foam)  - FTEC (foam)    30 sec   30 sec   30 sec   3 sec        DGI /24        FGA 23/30        DHI 32/100 (low)        JPET  degrees                    "

## 2024-12-04 ENCOUNTER — OFFICE VISIT (OUTPATIENT)
Dept: PHYSICAL THERAPY | Facility: MEDICAL CENTER | Age: 59
End: 2024-12-04
Payer: COMMERCIAL

## 2024-12-04 DIAGNOSIS — R42 DIZZINESS: Primary | ICD-10-CM

## 2024-12-04 PROCEDURE — 97530 THERAPEUTIC ACTIVITIES: CPT | Performed by: PHYSICAL THERAPIST

## 2024-12-04 NOTE — PROGRESS NOTES
PT Progress Note     Today's date: 2024  Patient name: Betzy Willard  : 1965  MRN: [id number]  Referring provider: Ovidio Luque DO  Dx:   Encounter Diagnosis     ICD-10-CM    1. Dizziness  R42             Assessment  Assessment details: Patient is a 59 y.o. Female who has been attending skilled outpatient PT with complaints of dizziness. PMH significant for left ear tinnitus and partial hearing loss. Over the last month patient reports that she has had reduced dizziness but continues to feel off-balance frequently. Since her initial evaluation patient displays improvement with her static balance during FTEC on foam condition of mCTSIB, suggesting improved use of vestibular system of balance. She also improved her scoring with DHI and FGA, suggesting low level of subjective handicap and scoring below cutoff for increased risk of falls. Patient continues to demonstrate the most difficulty during oculomotor screening, as she was unable to tolerate all testing today and reporting increased symptoms during smooth pursuits, saccades, and VOR x1 testing. Plan to increase focus on oculomotor and VOR exercises. Patient will continue to benefit from skilled PT services to continue to reduce her dizziness, improve her balance, and help her return to her PLOF.     Patient verbalized understanding of POC.    Please contact me if you have any questions or recommendations. Thank you for the referral and the opportunity to share in Betzy Willard's care.      Cut off score   All date taken from [facility] Neuro Section or Rehab Measures    DGI:  MDC for Vestibular Disorders: 4 points  MDC for Geriatrics/Community Dwelling Older Adults: 3 Points  Falls risk cut off: <19/24    FGA:  MCID: 4 points  Geriatrics/Community Dwelling Older Adults: </= 22/30 fall risk  Geriatrics/Community Dwelling Older Adults: </= 20/30 unexplained falls in the next 6  months  Parkinsons: </= 18/30 fall risk    mCTSIB (normed on ages 20-60, lower number is less sway or better static balance)  Eyes open firm surface (norm 0.21-0.48)  Eyes closed firm surface (norm 0.48-0.99)  Eyes open foam surface (norm 0.38-0.71)  Eyes closed foam surface (norm 0.70-2.22)    DHI:  0-39: low perception of handicap  40-69: moderate perception of handicap  : severe perception of handicap  > 60: increased risk for falls    Joint Position Error Testing (JPET):  > 4.5 degrees: abnormal joint proprioception        Impairments: Abnormal muscle tone, abnormal or restricted ROM, activity intolerance, impaired balance, impaired physical strength, lacks appropriate HEP, poor posture, safety issue  Understanding of Dx/Px/POC: good  Prognosis: good      Goals    Vestibular Short Term Goals (4 weeks):  - Patient will be independent with simple HEP  - Patient will tolerate 60 seconds of oculomotor exercises with minimal increase in symptoms  - Patient will be able to tolerate 30 seconds with eyes closed on foam surface without any loss of balance demonstrating improvement in vestibular system  - Patient will improve FGA score by 4 points per MDC to promote improved safety with dynamic tasks    Vestibular Long Term Goals (12 weeks):  - Patient will be independent with complex HEP  - Patient will tolerate >=2 minutes of oculomotor exercises to facilitate return to reading and computer work  - Patient will demonstrate ability to perform HT in gait without veering  - Patient will score low risk for falls with FGA test with score of 23/30 or higher per current research data  - Patient will report baseline dizziness of 1/10 or less   - Patient will report 2/10 dizziness or less with visual stimulating surround with duration of 2 minutes   - Patient will report subjective improvement to 90% or higher to promote return to PLOF      Plan  Plan details: oculomotor screen, FGA  Patient would benefit from: Skilled  PT  Planned modality interventions: Biofeedback, Cryotherapy, TENS, Thermotherapy  Planned therapy interventions: balance, balance/WB training, breathing training, body mechanics training, coordination, flexibility, functional ROM exercises, gait training, HEP, manual therapy, motor coordination training, neuromuscular re-education, patient education, postural training, strengthening, stretching, therapeutic activities, therapeutic exercises  Frequency: 2x/wk  Duration in weeks: 12  Plan of Care beginning date: 2024  Plan of Care expiration date: 12 weeks - 2025  Treatment plan discussed with: Patient        Subjective Evaluation    History of Present Illness  Mechanism of injury: Patient reports that she has been having dizziness, ringing in her ear, ear pain, and balance issues. She states that approximately 10 years ago she began having issues with vertigo. She has recently noticed a significant worsening of her symptoms over the last 3 months. Patient states that she had 2 episodes of room spinning dizziness and reports her dizziness has been very bad over the last 2 days.     Update (24): Patient reports that over the last month she has been feeling better. She has been having some symptom-free days which she has not had for a long time. She continue to have dizziness/imbalance symptoms, less frequently than before. Patient has had zero falls in the last month.       Dizziness Subjective  How long does dizziness last: up to 1 day  How would you describe the dizziness: spinning dizziness, off-balance  Rolling in bed: Yes  Supine to/from sit: No  Recent hearing loss: No  Tinnitus: Yes  Aural fullness/ear pain: No  Vision changes: No  History of recent viral infections: No  History of migraines: No    Red Flag Screen  - Numbness: No  - Tingling: No  - Weakness: No    Pain  Current pain ratin/10    Social Support  Steps to enter house: 10 steps  Stairs in house: none   Lives in: apartment  Lives  with:     Employment status:     Treatments  Previous treatment: PT (orthopedic)      Objective     Vestibular Objective  Cervical Spine AROM:  - Flexion: WFL  increased dizziness  - Extension: minimal limitation  increased dizziness  - R Rotation: WFL no pain  - L Rotation: WFL no pain  - R Lateral Flexion: WFL  increased dizziness  - L Lateral Flexion: WFL no pain    Integrity Testing  - mVBI: WNL  - Sharp Jerilyn: WNL  - Alar Stability Test: WNL    Oculomotor Screen   Dizziness 0/ 10     -Smooth Pursuits- Central   Abnormal, Dizziness 4/10 (vertical)    -Gaze holding nystagmus-   Normal, Dizziness 0/10    -Spontaneous nystagmus room light-  Normal, Dizziness 0/10    -Near Point Convergence- Central   Normal, 4 Inches/CM ( 4 inch/ 6CM norm)   Dizziness 0/10    -Saccades- Central   Horizontal   Abnormal, Dizziness 8/10  Vertical   Normal, Dizziness 0/10    -VOR Screen / Motion Sensitivity-   Horizontal   Abnormal, Dizziness 5/10  Vertical   Abnormal, Dizziness 7/10    -VOR Cancel- Central (NT today)  Horizontal   Abnormal, Dizziness 4/10  Vertical   Abnormal, Dizziness 2/10      -Head Thrust-  Horizontal  NOT TESTED          Outcome Measures   11/6/2024 PN  12/4/24       mCTSIB  - FTEO (firm)  - FTEC (firm)  - FTEO (foam)  - FTEC (foam)    30 sec   30 sec   30 sec   3 sec    30 sec   30 sec   30 sec   30 sec       DGI /24        FGA 23/30 25/30       DHI 32/100 (low) 28/100 (low)       JPET  degrees        ABC  82.5%                                                  Precautions:   Past Medical History:   Diagnosis Date    Colon polyp     Disease of thyroid gland     Dizziness 6/22    Ear problems     Hyperlipidemia     Hypertension     Postoperative hypothyroidism     Tinnitus     Vertigo

## 2024-12-09 ENCOUNTER — OFFICE VISIT (OUTPATIENT)
Dept: PHYSICAL THERAPY | Facility: MEDICAL CENTER | Age: 59
End: 2024-12-09
Payer: COMMERCIAL

## 2024-12-09 DIAGNOSIS — R42 DIZZINESS: Primary | ICD-10-CM

## 2024-12-09 PROCEDURE — 97112 NEUROMUSCULAR REEDUCATION: CPT | Performed by: PHYSICAL THERAPIST

## 2024-12-09 NOTE — PROGRESS NOTES
Daily Note     Today's date: 2024  Patient name: Betzy Willard  : 1965  MRN: [id number]  Referring provider: Ovidio Luque DO  Dx:   Encounter Diagnosis     ICD-10-CM    1. Dizziness  R42                        Subjective: Patient reports that she was feeling sick over the weekend, not feeling bad today       Objective: See treatment diary below    NMR:  VOR Cx (seated, 60 sec)  - H: 2/10  - CW: 4/10  - CCW: 1.5/10  - V: 1/10    VOR x1 (standing, 60 sec)  - H: 2/10, 2/10  - V: 6/10 (37 sec), 2/10    Sidestepping on foam: 10 laps  Sidestepping on foam w/ hurdles: 10 laps  Ball toss on foam w/ squats: 30x        HEP: Seated VOR HT/HN, cervical SNAG, VOR Cx, Tandem ambulation       Assessment: Patient tolerated treatment well. Continued with vestibular exercises, progressing patient to complete VOR x1 in standing position today and reporting increased dizziness with vertical direction. Foam exercises completed today to challenge patients dynamic balance, using hurdles to increase difficulty with patient displaying anterior/posterior sway but minimal LOB. Plan to progress as tolerated. Patient will benefit from skilled PT services to reduce her dizziness and help her return to her PLOF.       Plan: Continue per plan of care.          Outcome Measures   2024        mCTSIB  - FTEO (firm)  - FTEC (firm)  - FTEO (foam)  - FTEC (foam)    30 sec   30 sec   30 sec   3 sec        DGI /24        FGA 23/30        DHI 32/100 (low)        JPET  degrees

## 2024-12-11 ENCOUNTER — OFFICE VISIT (OUTPATIENT)
Dept: PHYSICAL THERAPY | Facility: MEDICAL CENTER | Age: 59
End: 2024-12-11
Payer: COMMERCIAL

## 2024-12-11 DIAGNOSIS — R42 DIZZINESS: Primary | ICD-10-CM

## 2024-12-11 PROCEDURE — 97112 NEUROMUSCULAR REEDUCATION: CPT | Performed by: PHYSICAL THERAPIST

## 2024-12-11 NOTE — PROGRESS NOTES
Daily Note     Today's date: 2024  Patient name: Betzy Willard  : 1965  MRN: [id number]  Referring provider: Ovidio Luque DO  Dx:   Encounter Diagnosis     ICD-10-CM    1. Dizziness  R42                        Subjective: Patient reports that she is feeling pretty good today      Objective: See treatment diary below    NMR:  VOR Cx (seated, 60 sec)  - H: 2/10  - CW: 2/10  - CCW: 4/10  - V: 1/10    VOR x1 (seated, 60 sec)  - H: 1/10  - V: 3/10     VOR x1 (standing, 60 sec)  - H: 1/10  - V: 3/10     Standing saccades (30 sec)  - H: 2/10 D  - V: 1/10 D    VOR x2 (seated, 10x)  - H: 6/10,   - V: 1/10 D, 2/10 D        HEP: Seated VOR HT/HN, cervical SNAG, VOR Cx, Tandem ambulation, VOR x2, Saccades      Assessment: Patient tolerated treatment well. Progressed patient to complete VOR x2 today, reporting increased dizziness and difficulty with coordination, most noted with horizontal direction. Saccades exercises added today in sitting and standing position, reporting reduced symptoms compared to previous testing. VOR x2 and saccades added to HEP. Seated rest breaks provided in between exercises to reduce dizziness to baseline levels prior to beginning next exercise. Patient will benefit from skilled PT services to reduce her dizziness and help her return to her PLOF.       Plan: Continue per plan of care.          Outcome Measures   2024        mCTSIB  - FTEO (firm)  - FTEC (firm)  - FTEO (foam)  - FTEC (foam)    30 sec   30 sec   30 sec   3 sec        DGI /24        FGA         DHI 32/100 (low)        JPET  degrees

## 2024-12-16 ENCOUNTER — OFFICE VISIT (OUTPATIENT)
Dept: PHYSICAL THERAPY | Facility: MEDICAL CENTER | Age: 59
End: 2024-12-16
Payer: COMMERCIAL

## 2024-12-16 DIAGNOSIS — R42 DIZZINESS: Primary | ICD-10-CM

## 2024-12-16 PROCEDURE — 97112 NEUROMUSCULAR REEDUCATION: CPT | Performed by: PHYSICAL THERAPIST

## 2024-12-16 NOTE — PROGRESS NOTES
Daily Note     Today's date: 2024  Patient name: Betzy Willard  : 1965  MRN: [id number]  Referring provider: Ovidio Luque DO  Dx:   Encounter Diagnosis     ICD-10-CM    1. Dizziness  R42                        Subjective: Patient reports that she is feeling pretty good today      Objective: See treatment diary below    NMR:  - Ambulation w/ VOR x1 (horizontal): 2 laps x 20 ft, 3/10 D, 2 sets  - Ambulation w/ VOR x1 (vertical): 2 laps x 20 ft, 3-5/10 D, 2 sets  - FWD/BWD tandem on foam: 10 laps  - Tandem stance on foam beam w/ ball toss: 20x      VOR Cx (standing on foam beam, 60 sec)  - H: 5/10          HEP: Seated VOR HT/HN, cervical SNAG, VOR Cx, Tandem ambulation, VOR x2, Saccades      Assessment: Patient tolerated treatment well. Continued with vestibular exercises, adding unstable foam surfaces and ambulation to increase difficulty. During VOR x1 with ambulation patient reports increased dizziness and displays minor lateral deviation but had no LOB. Attempted to complete VOR Cx on foam pad however patient only able to complete horizontal direction due to increased symptoms. Patient will benefit from skilled PT services to reduce her dizziness and help her return to her PLOF.       Plan: Continue per plan of care.          Outcome Measures   2024        mCTSIB  - FTEO (firm)  - FTEC (firm)  - FTEO (foam)  - FTEC (foam)    30 sec   30 sec   30 sec   3 sec        DGI /24        FGA 23/30        DHI 32/100 (low)        JPET  degrees

## 2024-12-18 ENCOUNTER — OFFICE VISIT (OUTPATIENT)
Dept: PHYSICAL THERAPY | Facility: MEDICAL CENTER | Age: 59
End: 2024-12-18
Payer: COMMERCIAL

## 2024-12-18 DIAGNOSIS — R42 DIZZINESS: Primary | ICD-10-CM

## 2024-12-18 PROCEDURE — 97112 NEUROMUSCULAR REEDUCATION: CPT | Performed by: PHYSICAL THERAPIST

## 2024-12-18 NOTE — PROGRESS NOTES
Daily Note     Today's date: 2024  Patient name: Betzy Willard  : 1965  MRN: [id number]  Referring provider: Ovidio Luque DO  Dx:   Encounter Diagnosis     ICD-10-CM    1. Dizziness  R42                        Subjective: Patient reports that she is feeling pretty good today      Objective: See treatment diary below    NMR:  - Ambulation w/ horizontal trackin laps x 20 ft, 6/10 D, 2 sets  - Ambulation w/ vertical trackin laps x 20 ft, 5/10 D, 2 sets  - Tandem ambulation: 10 laps  - Tandem ambulation on foam: 10 laps  - Sidestepping on foam w/ VOR Cx (horizontal): 3 laps      VOR x1 (90 sec, self-paced)  - H: 2/10  - V: 4/10            HEP: Seated VOR HT/HN, cervical SNAG, VOR Cx, Tandem ambulation, VOR x2, Saccades      Assessment: Patient tolerated treatment well. Challenged patient with horizontal and vertical tracking today with ambulation, completing with moderate increases in dizziness but no observable LOB. Foam beams added to sidestepping and tandem exercises, with increased LOB but able to self correct with appropriate balance reactions. Patient will benefit from skilled PT services to reduce her dizziness and help her return to her PLOF.       Plan: Continue per plan of care.          Outcome Measures   2024        mCTSIB  - FTEO (firm)  - FTEC (firm)  - FTEO (foam)  - FTEC (foam)    30 sec   30 sec   30 sec   3 sec        DGI /24        FGA 23/30        DHI 32/100 (low)        JPET  degrees

## 2024-12-23 ENCOUNTER — OFFICE VISIT (OUTPATIENT)
Dept: PHYSICAL THERAPY | Facility: MEDICAL CENTER | Age: 59
End: 2024-12-23
Payer: COMMERCIAL

## 2024-12-23 DIAGNOSIS — R42 DIZZINESS: Primary | ICD-10-CM

## 2024-12-23 PROCEDURE — 97112 NEUROMUSCULAR REEDUCATION: CPT | Performed by: PHYSICAL THERAPIST

## 2024-12-23 NOTE — PROGRESS NOTES
Daily Note     Today's date: 2024  Patient name: Betzy Willard  : 1965  MRN: [id number]  Referring provider: Ovidio Luque DO  Dx:   Encounter Diagnosis     ICD-10-CM    1. Dizziness  R42                        Subjective: Patient reports that she is feeling pretty good today      Objective: See treatment diary below    NMR:  - Ambulation w/ horizontal VOR x1: 5 laps x 20 ft, 2/10 D  - Ambulation w/ vertical trackin laps x 20 ft, 3/10 D  - Tandem ambulation on foam: 5 laps  - Tandem ambulation on foam w/ VOR x1 (H): 5 laps  - Sidestepping on foam w/ VOR Cx (horizontal): 3 laps  - Sidestepping on foam w/ VOR Cx (horizontal): 2 laps  - FAEC on foam: 10x 2 sets    VOR x1 (90 sec, self-paced)  - H: /10  - V: 4/10    VOR x1 (90 sec, self-paced on foam beam)  - H: 3/10  - V: 3/10        HEP: Seated VOR HT/HN, cervical SNAG, VOR Cx, Tandem ambulation, VOR x2, Saccades      Assessment: Patient tolerated treatment well. Continued to progress VOR exercises, completing on foam beam today with no increased subjective dizziness. Patient most challenged with HT coordination when on foam beam with occasional LOB but able to self-correct without PT assist. Plan to complete reassessment in 4 weeks with plans to discharge or continue with treatment at that time. Patient will benefit from skilled PT services to reduce her dizziness and help her return to her PLOF.       Plan: Continue per plan of care.        Outcome Measures   2024 PN  24       mCTSIB  - FTEO (firm)  - FTEC (firm)  - FTEO (foam)  - FTEC (foam)    30 sec   30 sec   30 sec   3 sec    30 sec   30 sec   30 sec   30 sec       DGI /24        FGA /30 25/30       DHI 32/100 (low) 28/100 (low)       JPET  degrees        ABC  82.5%

## 2025-02-18 ENCOUNTER — TELEPHONE (OUTPATIENT)
Age: 60
End: 2025-02-18

## 2025-02-18 NOTE — TELEPHONE ENCOUNTER
Patient called and asked for a mammogram script and she goes to [facility] and did not want to wait for her yearly to get her script.  I gave her central scheduling number and she does not need a call back unless we cannot do the script for her. Thank you

## 2025-02-24 ENCOUNTER — HOSPITAL ENCOUNTER (OUTPATIENT)
Dept: RADIOLOGY | Facility: MEDICAL CENTER | Age: 60
Discharge: HOME | End: 2025-02-24
Payer: COMMERCIAL

## 2025-02-24 VITALS — WEIGHT: 245 LBS | BODY MASS INDEX: 37.13 KG/M2 | HEIGHT: 68 IN

## 2025-02-24 DIAGNOSIS — Z12.31 ENCOUNTER FOR SCREENING MAMMOGRAM FOR MALIGNANT NEOPLASM OF BREAST: ICD-10-CM

## 2025-02-24 PROCEDURE — 77067 SCR MAMMO BI INCL CAD: CPT

## 2025-02-24 PROCEDURE — 77063 BREAST TOMOSYNTHESIS BI: CPT

## 2025-02-27 ENCOUNTER — RESULTS FOLLOW-UP (OUTPATIENT)
Dept: OBGYN CLINIC | Facility: CLINIC | Age: 60
End: 2025-02-27

## 2025-02-27 NOTE — RESULT ENCOUNTER NOTE
Marcellus Bo,     Your mammogram results came back normal. Please repeat in one year.     If you have any questions or concerns please call the office here at [phone].     Thank you,   [facility]

## 2025-08-08 ENCOUNTER — E-CONSULT (OUTPATIENT)
Dept: RHEUMATOLOGY | Facility: CLINIC | Age: 60
End: 2025-08-08
Payer: COMMERCIAL

## 2025-08-08 DIAGNOSIS — R76.8 POSITIVE ANA (ANTINUCLEAR ANTIBODY): Primary | ICD-10-CM

## 2025-08-08 PROCEDURE — 99446 NTRPROF PH1/NTRNET/EHR 5-10: CPT | Performed by: INTERNAL MEDICINE

## 2025-08-09 ENCOUNTER — HOSPITAL ENCOUNTER (OUTPATIENT)
Facility: HOSPITAL | Age: 60
Setting detail: OBSERVATION
Discharge: HOME | End: 2025-08-11
Admitting: INTERNAL MEDICINE
Payer: COMMERCIAL

## 2025-08-09 PROBLEM — R42 DIZZINESS: Status: ACTIVE | Noted: 2025-08-09

## 2025-08-09 PROBLEM — R94.31 PROLONGED Q-T INTERVAL ON ECG: Status: ACTIVE | Noted: 2025-08-09

## 2025-08-09 PROBLEM — E78.2 MIXED HYPERLIPIDEMIA: Status: ACTIVE | Noted: 2021-02-09

## 2025-08-09 PROBLEM — N17.9 AKI (ACUTE KIDNEY INJURY) (HCC): Status: ACTIVE | Noted: 2025-08-09

## 2025-08-09 PROBLEM — D50.9 IRON DEFICIENCY ANEMIA: Status: ACTIVE | Noted: 2025-08-09

## 2025-08-16 ENCOUNTER — HOSPITAL ENCOUNTER (OUTPATIENT)
Dept: MRI IMAGING | Facility: HOSPITAL | Age: 60
Discharge: HOME | End: 2025-08-16
Attending: PHYSICIAN ASSISTANT
Payer: COMMERCIAL

## 2025-08-16 DIAGNOSIS — H90.42 SENSORINEURAL HEARING LOSS (SNHL) OF LEFT EAR WITH UNRESTRICTED HEARING OF RIGHT EAR: ICD-10-CM

## 2025-08-16 DIAGNOSIS — H81.02 MENIERE'S DISEASE, LEFT: ICD-10-CM

## 2025-08-16 DIAGNOSIS — R42 VERTIGO: ICD-10-CM

## 2025-08-16 DIAGNOSIS — H93.12 TINNITUS, LEFT: ICD-10-CM

## 2025-08-16 DIAGNOSIS — H91.22 SUDDEN HEARING LOSS, LEFT: ICD-10-CM

## 2025-08-16 PROCEDURE — A9585 GADOBUTROL INJECTION: HCPCS | Performed by: PHYSICIAN ASSISTANT

## 2025-08-16 PROCEDURE — 70553 MRI BRAIN STEM W/O & W/DYE: CPT

## 2025-08-16 RX ORDER — GADOBUTROL 604.72 MG/ML
11 INJECTION INTRAVENOUS
Status: COMPLETED | OUTPATIENT
Start: 2025-08-16 | End: 2025-08-16

## 2025-08-16 RX ADMIN — GADOBUTROL 11 ML: 604.72 INJECTION INTRAVENOUS at 10:08
